# Patient Record
Sex: FEMALE | Race: WHITE | NOT HISPANIC OR LATINO | Employment: UNEMPLOYED | URBAN - NONMETROPOLITAN AREA
[De-identification: names, ages, dates, MRNs, and addresses within clinical notes are randomized per-mention and may not be internally consistent; named-entity substitution may affect disease eponyms.]

---
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
## 2020-01-08 ENCOUNTER — OFFICE VISIT (OUTPATIENT)
Dept: OBSTETRICS AND GYNECOLOGY | Facility: CLINIC | Age: 20
End: 2020-01-08

## 2020-01-08 VITALS
BODY MASS INDEX: 26.81 KG/M2 | HEIGHT: 59 IN | DIASTOLIC BLOOD PRESSURE: 68 MMHG | WEIGHT: 133 LBS | SYSTOLIC BLOOD PRESSURE: 102 MMHG

## 2020-01-08 DIAGNOSIS — N94.12 DEEP DYSPAREUNIA: Primary | ICD-10-CM

## 2020-01-08 LAB
CANDIDA ALBICANS: NEGATIVE
GARDNERELLA VAGINALIS: NEGATIVE
T VAGINALIS DNA VAG QL PROBE+SIG AMP: NEGATIVE

## 2020-01-08 PROCEDURE — 87480 CANDIDA DNA DIR PROBE: CPT | Performed by: NURSE PRACTITIONER

## 2020-01-08 PROCEDURE — 87660 TRICHOMONAS VAGIN DIR PROBE: CPT | Performed by: NURSE PRACTITIONER

## 2020-01-08 PROCEDURE — 99203 OFFICE O/P NEW LOW 30 MIN: CPT | Performed by: NURSE PRACTITIONER

## 2020-01-08 PROCEDURE — 87510 GARDNER VAG DNA DIR PROBE: CPT | Performed by: NURSE PRACTITIONER

## 2020-01-08 PROCEDURE — 87661 TRICHOMONAS VAGINALIS AMPLIF: CPT | Performed by: NURSE PRACTITIONER

## 2020-01-08 PROCEDURE — 87591 N.GONORRHOEAE DNA AMP PROB: CPT | Performed by: NURSE PRACTITIONER

## 2020-01-08 PROCEDURE — 87491 CHLMYD TRACH DNA AMP PROBE: CPT | Performed by: NURSE PRACTITIONER

## 2020-01-08 NOTE — PROGRESS NOTES
"Subjective   Marcie Roberts is a 19 y.o. here for painful sex    LMP: Amenorrhea with Depo-Provera  BC: Nexplanon      Pt is referred by her PCP for painful sex. She is accompanied by her spouse today. Pt reports painful sex for the last 2-3 months and has never had this problem before. She states, \"I feel like I'm getting stabbed\". Because of this pain, she and her  had sex only 1-2 times this month and as a result, she has low sexual desire. She reports the pain is deep and \"inside\"; there is no pain to the vulva or opening of the vagina. The pain only occurs with sex, does not persist after sex or occur any other time. She denies any postcoital bleeding, vaginal odor, discharge, or irritation. Denies hx of STIs.    Dyspareunia   This is a new problem. The current episode started more than 1 month ago. The problem occurs intermittently. The problem has been unchanged. Pertinent negatives include no abdominal pain, chills, fatigue, fever, nausea, rash, sore throat or weakness. Nothing aggravates the symptoms. She has tried nothing for the symptoms.       The following portions of the patient's history were reviewed and updated as appropriate: allergies, current medications, past family history, past medical history, past social history, past surgical history and problem list.    Review of Systems   Constitutional: Negative for chills, fatigue, fever, unexpected weight gain and unexpected weight loss.   HENT: Negative for sneezing and sore throat.    Respiratory: Negative for shortness of breath.    Gastrointestinal: Negative for abdominal pain, constipation, diarrhea and nausea.   Genitourinary: Positive for amenorrhea and dyspareunia. Negative for breast discharge, breast lump, breast pain, difficulty urinating, dysuria, frequency, menstrual problem, pelvic pain, pelvic pressure, urinary incontinence, vaginal bleeding, vaginal discharge and vaginal pain.        No periods with Nexplanon.     Skin: " Negative for rash.   Neurological: Negative for weakness and headache.   Psychiatric/Behavioral: Negative for sleep disturbance, depressed mood and stress.       Objective   Physical Exam   Constitutional: She is oriented to person, place, and time. She appears well-developed and well-nourished.   HENT:   Head: Normocephalic.   Neck: Normal range of motion.   Pulmonary/Chest: Effort normal.   Abdominal: Soft.   Genitourinary: Vagina normal. Rectal exam shows no external hemorrhoid. There is no rash, tenderness, lesion or injury on the right labia. There is tenderness on the left labia. There is no rash, lesion or injury on the left labia. Uterus is not deviated, not enlarged, not fixed and not tender. Cervix exhibits no motion tenderness, no discharge and no friability. Right adnexum displays no mass, no tenderness and no fullness. Left adnexum displays no mass, no tenderness and no fullness. No erythema, tenderness or bleeding in the vagina. No foreign body in the vagina. No signs of injury around the vagina. No vaginal discharge found.   Genitourinary Comments: GC and Vag panel swab.    Musculoskeletal: Normal range of motion.   Neurological: She is alert and oriented to person, place, and time.   Skin: Skin is warm and dry.   Psychiatric: Her behavior is normal.   Flat affect.    Nursing note and vitals reviewed.        Assessment/Plan   Diagnoses and all orders for this visit:    Deep dyspareunia  -     Gardnerella vaginalis, Trichomonas vaginalis, Candida albicans, DNA - Swab, Vagina  -     Chlamydia trachomatis, Neisseria gonorrhoeae, Trichomonas vaginalis, PCR - Swab, Vagina  -     US Non-ob Transvaginal; Future      Discussed various causes of painful sex such as infection, endometriosis; however sometimes it is difficult to determine cause. Will rule out STIs, pt declined STI via bloodwork. Will scheduled a TVUS; call pt with result.

## 2020-01-09 LAB
C TRACH RRNA CVX QL NAA+PROBE: NEGATIVE
N GONORRHOEA RRNA SPEC QL NAA+PROBE: NEGATIVE
TRICHOMONAS VAGINALIS PCR: NEGATIVE

## 2020-01-13 ENCOUNTER — TELEPHONE (OUTPATIENT)
Dept: OBSTETRICS AND GYNECOLOGY | Facility: CLINIC | Age: 20
End: 2020-01-13

## 2020-01-15 ENCOUNTER — TELEPHONE (OUTPATIENT)
Dept: OBSTETRICS AND GYNECOLOGY | Facility: CLINIC | Age: 20
End: 2020-01-15

## 2020-01-15 NOTE — TELEPHONE ENCOUNTER
Attempted to call patient back message states the number you are calling has been changed, disconnected, or no longer in service.

## 2020-01-17 ENCOUNTER — TELEPHONE (OUTPATIENT)
Dept: OBSTETRICS AND GYNECOLOGY | Facility: CLINIC | Age: 20
End: 2020-01-17

## 2020-01-17 RX ORDER — BUPROPION HYDROCHLORIDE 150 MG/1
150 TABLET ORAL EVERY MORNING
Qty: 30 TABLET | Refills: 2 | Status: SHIPPED | OUTPATIENT
Start: 2020-01-17 | End: 2021-01-16

## 2020-01-17 NOTE — TELEPHONE ENCOUNTER
Discussed TVUS results with patient. Pt reports that she wants treatment for decreased sex drive. Will try Wellbutrin 150 mg daily. Discuss RBA; gave 2 months supply. Call for refills.

## 2021-03-01 ENCOUNTER — INITIAL PRENATAL (OUTPATIENT)
Dept: OBSTETRICS AND GYNECOLOGY | Facility: CLINIC | Age: 21
End: 2021-03-01

## 2021-03-01 ENCOUNTER — LAB (OUTPATIENT)
Dept: LAB | Facility: HOSPITAL | Age: 21
End: 2021-03-01

## 2021-03-01 VITALS — BODY MASS INDEX: 27.27 KG/M2 | SYSTOLIC BLOOD PRESSURE: 118 MMHG | WEIGHT: 135 LBS | DIASTOLIC BLOOD PRESSURE: 74 MMHG

## 2021-03-01 DIAGNOSIS — O99.331 MATERNAL TOBACCO USE IN FIRST TRIMESTER: ICD-10-CM

## 2021-03-01 DIAGNOSIS — O21.9 NAUSEA AND VOMITING DURING PREGNANCY PRIOR TO 22 WEEKS GESTATION: ICD-10-CM

## 2021-03-01 DIAGNOSIS — Z36.87 ENCOUNTER FOR ANTENATAL SCREENING FOR UNCERTAIN DATES: ICD-10-CM

## 2021-03-01 DIAGNOSIS — A04.8 H. PYLORI INFECTION: ICD-10-CM

## 2021-03-01 DIAGNOSIS — Z34.81 MULTIGRAVIDA IN FIRST TRIMESTER: Primary | ICD-10-CM

## 2021-03-01 DIAGNOSIS — Z34.80 SUPERVISION OF OTHER NORMAL PREGNANCY: Primary | ICD-10-CM

## 2021-03-01 LAB
ABO GROUP BLD: NORMAL
AMPHET+METHAMPHET UR QL: NEGATIVE
AMPHETAMINES UR QL: NEGATIVE
BARBITURATES UR QL SCN: NEGATIVE
BASOPHILS # BLD AUTO: 0.03 10*3/MM3 (ref 0–0.2)
BASOPHILS NFR BLD AUTO: 0.3 % (ref 0–1.5)
BENZODIAZ UR QL SCN: NEGATIVE
BILIRUB UR QL STRIP: NEGATIVE
BLD GP AB SCN SERPL QL: NEGATIVE
BUPRENORPHINE SERPL-MCNC: NEGATIVE NG/ML
CANNABINOIDS SERPL QL: NEGATIVE
CLARITY UR: CLEAR
COCAINE UR QL: NEGATIVE
COLOR UR: YELLOW
DEPRECATED RDW RBC AUTO: 38.7 FL (ref 37–54)
EOSINOPHIL # BLD AUTO: 0.06 10*3/MM3 (ref 0–0.4)
EOSINOPHIL NFR BLD AUTO: 0.7 % (ref 0.3–6.2)
ERYTHROCYTE [DISTWIDTH] IN BLOOD BY AUTOMATED COUNT: 12 % (ref 12.3–15.4)
GLUCOSE UR STRIP-MCNC: NEGATIVE MG/DL
HBV SURFACE AG SERPL QL IA: NORMAL
HCT VFR BLD AUTO: 39.7 % (ref 34–46.6)
HCV AB SER DONR QL: NORMAL
HGB BLD-MCNC: 13.9 G/DL (ref 12–15.9)
HGB UR QL STRIP.AUTO: NEGATIVE
HIV1+2 AB SER QL: NORMAL
IMM GRANULOCYTES # BLD AUTO: 0.02 10*3/MM3 (ref 0–0.05)
IMM GRANULOCYTES NFR BLD AUTO: 0.2 % (ref 0–0.5)
KETONES UR QL STRIP: NEGATIVE
LEUKOCYTE ESTERASE UR QL STRIP.AUTO: NEGATIVE
LYMPHOCYTES # BLD AUTO: 1.73 10*3/MM3 (ref 0.7–3.1)
LYMPHOCYTES NFR BLD AUTO: 19.7 % (ref 19.6–45.3)
Lab: NORMAL
MCH RBC QN AUTO: 31.4 PG (ref 26.6–33)
MCHC RBC AUTO-ENTMCNC: 35 G/DL (ref 31.5–35.7)
MCV RBC AUTO: 89.8 FL (ref 79–97)
METHADONE UR QL SCN: NEGATIVE
MONOCYTES # BLD AUTO: 0.39 10*3/MM3 (ref 0.1–0.9)
MONOCYTES NFR BLD AUTO: 4.4 % (ref 5–12)
NEUTROPHILS NFR BLD AUTO: 6.54 10*3/MM3 (ref 1.7–7)
NEUTROPHILS NFR BLD AUTO: 74.7 % (ref 42.7–76)
NITRITE UR QL STRIP: NEGATIVE
NRBC BLD AUTO-RTO: 0 /100 WBC (ref 0–0.2)
OPIATES UR QL: NEGATIVE
OXYCODONE UR QL SCN: NEGATIVE
PCP UR QL SCN: NEGATIVE
PH UR STRIP.AUTO: 6.5 [PH] (ref 5–8)
PLATELET # BLD AUTO: 362 10*3/MM3 (ref 140–450)
PMV BLD AUTO: 9.7 FL (ref 6–12)
PROPOXYPH UR QL: NEGATIVE
PROT UR QL STRIP: NEGATIVE
RBC # BLD AUTO: 4.42 10*6/MM3 (ref 3.77–5.28)
RH BLD: NEGATIVE
RPR SER QL: NORMAL
RUBV IGG SERPL IA-ACNC: NORMAL
SP GR UR STRIP: 1.01 (ref 1–1.03)
TRICYCLICS UR QL SCN: NEGATIVE
UROBILINOGEN UR QL STRIP: NORMAL
WBC # BLD AUTO: 8.77 10*3/MM3 (ref 3.4–10.8)

## 2021-03-01 PROCEDURE — 81003 URINALYSIS AUTO W/O SCOPE: CPT | Performed by: NURSE PRACTITIONER

## 2021-03-01 PROCEDURE — 80306 DRUG TEST PRSMV INSTRMNT: CPT | Performed by: NURSE PRACTITIONER

## 2021-03-01 PROCEDURE — 36415 COLL VENOUS BLD VENIPUNCTURE: CPT

## 2021-03-01 PROCEDURE — 87491 CHLMYD TRACH DNA AMP PROBE: CPT | Performed by: NURSE PRACTITIONER

## 2021-03-01 PROCEDURE — 99214 OFFICE O/P EST MOD 30 MIN: CPT | Performed by: NURSE PRACTITIONER

## 2021-03-01 PROCEDURE — 80081 OBSTETRIC PANEL INC HIV TSTG: CPT | Performed by: NURSE PRACTITIONER

## 2021-03-01 PROCEDURE — 87086 URINE CULTURE/COLONY COUNT: CPT | Performed by: NURSE PRACTITIONER

## 2021-03-01 PROCEDURE — 87661 TRICHOMONAS VAGINALIS AMPLIF: CPT | Performed by: NURSE PRACTITIONER

## 2021-03-01 PROCEDURE — 87591 N.GONORRHOEAE DNA AMP PROB: CPT | Performed by: NURSE PRACTITIONER

## 2021-03-01 PROCEDURE — 86803 HEPATITIS C AB TEST: CPT | Performed by: NURSE PRACTITIONER

## 2021-03-01 RX ORDER — MULTIVITAMIN WITH IRON
100 TABLET ORAL
Qty: 30 TABLET | Refills: 2 | Status: SHIPPED | OUTPATIENT
Start: 2021-03-01 | End: 2021-10-26

## 2021-03-01 RX ORDER — PRENATAL VIT/IRON FUM/FOLIC AC 27MG-0.8MG
TABLET ORAL DAILY
COMMUNITY
End: 2021-12-07

## 2021-03-01 NOTE — PROGRESS NOTES
[facility]  Obstetrics Visit    CHIEF COMPLAINT:  New prenatal visit    HISTORY OF PRESENT ILLNESS:  Marcie Roberts is a 20 y.o. y/o  at Unknown by LMP (Patient's last menstrual period was 2020 (exact date).  This was a planned pregnancy and the patient is supported by her significant other.  Reports nausea with vomiting, she is interested in medication options.  Reports breast tenderness.  She denies any vaginal bleeding.  She has started taking a prenatal vitamin.    EPDS: 2    REVIEW OF SYSTEMS  Review of Systems   Constitutional: Negative for activity change, appetite change, chills, diaphoresis, fatigue, fever, unexpected weight gain and unexpected weight loss.   Respiratory: Negative for apnea, chest tightness and shortness of breath.    Cardiovascular: Negative for chest pain and palpitations.   Gastrointestinal: Negative for abdominal distention, abdominal pain, constipation and diarrhea.   Genitourinary: Negative for amenorrhea, breast discharge, breast lump, breast pain, decreased libido, decreased urine volume, difficulty urinating, dyspareunia, dysuria, flank pain, frequency, genital sores, hematuria, pelvic pain, pelvic pressure, urgency, urinary incontinence, vaginal bleeding, vaginal discharge and vaginal pain.   Musculoskeletal: Negative for myalgias.   Skin: Negative for color change, dry skin and skin lesions.   Neurological: Negative for light-headedness and headache.   Psychiatric/Behavioral: Negative for agitation, dysphoric mood, sleep disturbance, suicidal ideas, depressed mood and stress. The patient is not nervous/anxious.        PRENATAL RISK FACTORS  3/21 Problems (from 21 to present)     Problem Noted Resolved    Multigravida in first trimester 3/1/2021 by Billie Mane, ZO No    Nausea and vomiting during pregnancy prior to 22 weeks gestation 3/1/2021 by Billie Mane, ZO No    Maternal tobacco use in first  trimester 3/1/2021 by Billie Mane APRN No    H. pylori infection 3/1/2021 by Billie Mane APRN No          DATING CRITERIA:  LMP (2021) -- VJ 10/07/2021      OBSTETRIC HISTORY:  OB History    Para Term  AB Living   2 1 1     1   SAB TAB Ectopic Molar Multiple Live Births             1      # Outcome Date GA Lbr Zenon/2nd Weight Sex Delivery Anes PTL Lv   2 Current            1 Term 04/20/15 40w6d  2892 g (6 lb 6 oz) F Vag-Spont EPI  BRI     GYN HISTORY:  Denies h/o sexually transmitted infections/pelvic inflammatory disease  Denies h/o abnormal pap smears  Last pap smear:   Last Completed Pap Smear     Patient has no health maintenance due at this time        Denies h/o gynecologic surgeries, including biopsies of the cervix    PAST MEDICAL HISTORY:  Past Medical History:   Diagnosis Date   • ADHD    • Depot contraception    • Encounter for  visit    • Encounter for surveillance of injectable contraceptive    • H. pylori infection    • Hypertension    • Lower abdominal pain      PAST SURGICAL HISTORY:  Past Surgical History:   Procedure Laterality Date   • ADENOIDECTOMY     • APPENDECTOMY     • CHOLECYSTECTOMY     • EAR TUBES     • ENDOSCOPY  2017    Focal active chronic gastritis, Few Helicobacter organisms Dr. Moses   • INJECTION OF MEDICATION  2016    depo provera   • LAPAROSCOPIC CHOLECYSTECTOMY     • TONSILLECTOMY AND ADENOIDECTOMY     • WISDOM TOOTH EXTRACTION       FAMILY HISTORY:  Family History   Problem Relation Age of Onset   • ADD / ADHD Other    • Bipolar disorder Other    • Breast cancer Other    • Cancer Other    • Diabetes Other    • Heart disease Other    • Stroke Other    • Stomach cancer Other    • Other Other         respiratory disorder   • No Known Problems Daughter    • No Known Problems Sister    • No Known Problems Brother    • No Known Problems Half-Brother    • No Known Problems Half-Brother    • No Known Problems  Sister    • No Known Problems Half-Sister    • No Known Problems Half-Sister    • Colon polyps Neg Hx    • Colon cancer Neg Hx      SOCIAL HISTORY:  Social History     Socioeconomic History   • Marital status: Single     Spouse name: Not on file   • Number of children: Not on file   • Years of education: Not on file   • Highest education level: Not on file   Tobacco Use   • Smoking status: Current Every Day Smoker     Packs/day: 0.00     Types: Cigarettes   • Smokeless tobacco: Never Used   • Tobacco comment: 2 cigarettes per day- quitting     Substance and Sexual Activity   • Alcohol use: Not Currently   • Drug use: Never   • Sexual activity: Yes     Partners: Male     Comment: last pap smear 8/2020 at [facility]. patient signed release      GENETIC SCREENING:  Age >36 yo as of VJ: no  Thalassemia: no  NTD: no  CHD: no  Down Syndrome/MR/Fragile X/Autism: no  Ashkenazi Muslim with Andreas-Sachs, [place], familial dysautonomia: no  Sickle cell disease or trait: no  Hemophilia: no  Muscular dystrophy: no  Cystic fibrosis: no  Greg's chorea: no  Birth defects: no  Genetic/chromosomal disorders: no    INFECTION HISTORY:  TB exposure: no  HSV: no  Illness since LMP: no  Prior GBS infected child: no  STIs: no    ALLERGIES:  No Known Allergies    MEDICATIONS:  Prior to Admission medications    Medication Sig Start Date End Date Taking? Authorizing Provider   Oxymetazoline HCl (MUCINEX NASAL SPRAY MOISTURE NA) into the nostril(s) as directed by provider.   Yes ProviderWale MD   Prenatal Vit-Fe Fumarate-FA (prenatal vitamin 27-0.8) 27-0.8 MG tablet tablet Take  by mouth Daily.   Yes ProviderWale MD   doxylamine (UNISOM) 25 MG tablet Take 1 tablet by mouth every night at bedtime. 3/1/21   Billie Mane APRN   vitamin B-6 (PYRIDOXINE) 100 MG tablet Take 1 tablet by mouth every night at bedtime. 3/1/21   Billie Mane APRN       PHYSICAL EXAM:   LMP 12/31/2020  (Exact Date)   General: Alert, healthy, no distress, well nourished and well developed.  Neurologic: Alert, oriented to person, place, and time.  Gait normal.  Cranial nerves II-XII grossly intact.  HEENT: Normocephalic, atraumatic.  Extraocular muscles intact, pupils equal and reactive x2.    Teeth: Normal hygiene.  Neck: Supple, no adenopathy, thyroid normal size, non-tender, without nodularity, trachea midline.  Breasts: No masses, skin dimpling, skin retraction, nipple discharge, or asymmetry bilaterally.  Lungs: Normal respiratory effort.  Clear to auscultation bilaterally.  No wheezes, rhonci, or rales.  Heart: Regular rate and rhythm.  No murmer, rub or gallop.  Abdomen: Soft, non-tender, non-distended,no masses, no hepatosplenomegaly, no hernia.  Skin: No rash, no lesions.  Extremities: No cyanosis, clubbing or edema.    Bedside ultrasound performed by myself which shows the findings below: +HHUS      IMPRESSION:  Marcie Roberts is a 20 y.o.  at Unknown for a new prenatal visit.    PLAN:  1.  NOB  - Options counseling performed and patient desires continuation of pregnancy to term   - Prenatal labs ordered  - Genetic testing, including cystic fibrosis, was discussed and patient declines  - Continue prenatal vitamins  - Weight gain counseling performed.   - Pregravid BMI 25-29.9: Recommend 15-25 lb  - Return to clinic in 4 weeks for return prenatal visit, dating scan next week  - Reviewed COVID-19 visitation policy  - Reviewed COVID-19 precautions     Diagnosis Plan   1. Multigravida in first trimester     2. Nausea and vomiting during pregnancy prior to 22 weeks gestation  vitamin B-6 (PYRIDOXINE) 100 MG tablet    doxylamine (UNISOM) 25 MG tablet   3. Maternal tobacco use in first trimester  Down to 2 cigarettes per day, encouraged complete smoking cessation   4. H. pylori infection     5. Encounter for  screening for uncertain dates  US Ob Transvaginal     Billie Mane  APRN  3/1/2021  13:14 CST

## 2021-03-01 NOTE — PROGRESS NOTES
"I spent approximately 45 minutes with the patient acquiring the health and history intake and discussing topics related to healthy lifestyle. She is accompanied by her boyfriend. She brings proof of pregnancy from [facility].  Her LMP is 12/31/20. This is her 2nd  pregnancy. She had a vaginal delivery here with her daughter. She had no complications. Mack Caro CNM delivered. The FOB's uncle has asperger's syndrome. There is not other significant family history.  A newob bag is given. The 1st trimester teaching was done with the patient. We discussed a healthy diet and exercise and what is recommended. I also discussed Listeriosis and Toxoplasmosis and what fish to avoid due to high mercury levels. I informed patient not to be in hot tubs, saunas, or tanning beds. We discussed that spotting may occur after intercourse which is common, but if heavy bleeding like a period occurs to [facility] hospital if clinic is closed.  I encouraged her to make an appointment with the dentist if she has not had a dental exam and cleaning in the last 6 months.  I instructed the patient that alcohol, illicit drug use, and tobacco smoking are not recommended in pregnancy. She is quitting smoking cigarettes. She is down to 2 cigarettes daily. I encouraged her not to be around secondhand smoke either.  She plans to breastfeed. She did not breastfeed her daughter, but she plans to breastfeed this time.  I gave her pamphlet on breastfeeding classes and the breastfeeding mothers support group. These services are provided by Trena Naqvi, Lactation Consultant. She filled out the health department referral form and depression screening questionnaire. She scored \"2\" on her depression screening form. She says she has ADHD, but she is not on medicine.  I encouraged the patient to get the TDAP vaccine in the 3rd trimester.  I discussed with the patient that a pediatrician needs to be chosen prior to delivery for " the infant to have an appointment scheduled before leaving the hospital. She plans to continue with Dr. Escobar who her daughter sees.  Her last pap smear was done at [facility] in August 2020. She signed a release for us to get her results.  I discussed lab tests will be done today. All questions were answered at this time. Her appointment is with Billie PATHAK today.

## 2021-03-02 LAB
BACTERIA SPEC AEROBE CULT: NORMAL
C TRACH RRNA CVX QL NAA+PROBE: NEGATIVE
N GONORRHOEA RRNA SPEC QL NAA+PROBE: NEGATIVE
TRICHOMONAS VAGINALIS PCR: NEGATIVE

## 2021-03-29 ENCOUNTER — ROUTINE PRENATAL (OUTPATIENT)
Dept: OBSTETRICS AND GYNECOLOGY | Facility: CLINIC | Age: 21
End: 2021-03-29

## 2021-03-29 VITALS — DIASTOLIC BLOOD PRESSURE: 60 MMHG | BODY MASS INDEX: 26.86 KG/M2 | SYSTOLIC BLOOD PRESSURE: 102 MMHG | WEIGHT: 133 LBS

## 2021-03-29 DIAGNOSIS — Z3A.12 12 WEEKS GESTATION OF PREGNANCY: Primary | ICD-10-CM

## 2021-03-29 DIAGNOSIS — A04.8 H. PYLORI INFECTION: ICD-10-CM

## 2021-03-29 DIAGNOSIS — Z34.81 MULTIGRAVIDA IN FIRST TRIMESTER: ICD-10-CM

## 2021-03-29 PROBLEM — O99.331 MATERNAL TOBACCO USE IN FIRST TRIMESTER: Status: RESOLVED | Noted: 2021-03-01 | Resolved: 2021-03-29

## 2021-03-29 PROCEDURE — 99213 OFFICE O/P EST LOW 20 MIN: CPT | Performed by: NURSE PRACTITIONER

## 2021-03-29 NOTE — PROGRESS NOTES
CC: Prenatal visit    Marcie Roberts is a 20 y.o.  at 12w4d.  Doing well.  No complaints.  Denies contractions, LOF, or VB.      /60   Wt 60.3 kg (133 lb)   LMP 2020 (Exact Date)   BMI 26.86 kg/m²             3/21 Problems (from 21 to present)     Problem Noted Resolved    Multigravida in first trimester 3/1/2021 by Billie Mane APRN No    Nausea and vomiting during pregnancy prior to 22 weeks gestation 3/1/2021 by Billie Mane APRN No    H. pylori infection 3/1/2021 by Billie Mane APRN No    Maternal tobacco use in first trimester 3/1/2021 by Billie Mane APRN 3/29/2021 by Billie Mane APRN          A/P: Marcie Roberts is a 20 y.o.  at 12w4d.   Reviewed NOB labs, rubella equivocal- can repeat at 3T if pt would like   - RTC in 4 weeks for LEONA appt      Diagnosis Plan   1. 12 weeks gestation of pregnancy     2. Multigravida in first trimester     3. H. pylori infection         ZO Rodríguez  3/29/2021  13:32 CDT

## 2021-04-26 ENCOUNTER — ROUTINE PRENATAL (OUTPATIENT)
Dept: OBSTETRICS AND GYNECOLOGY | Facility: CLINIC | Age: 21
End: 2021-04-26

## 2021-04-26 VITALS — DIASTOLIC BLOOD PRESSURE: 58 MMHG | WEIGHT: 138 LBS | BODY MASS INDEX: 27.87 KG/M2 | SYSTOLIC BLOOD PRESSURE: 102 MMHG

## 2021-04-26 DIAGNOSIS — Z36.89 ENCOUNTER FOR FETAL ANATOMIC SURVEY: ICD-10-CM

## 2021-04-26 DIAGNOSIS — Z34.82 MULTIGRAVIDA IN SECOND TRIMESTER: ICD-10-CM

## 2021-04-26 DIAGNOSIS — A04.8 H. PYLORI INFECTION: ICD-10-CM

## 2021-04-26 DIAGNOSIS — Z3A.16 16 WEEKS GESTATION OF PREGNANCY: Primary | ICD-10-CM

## 2021-04-26 PROBLEM — O21.9 NAUSEA AND VOMITING DURING PREGNANCY PRIOR TO 22 WEEKS GESTATION: Status: RESOLVED | Noted: 2021-03-01 | Resolved: 2021-04-26

## 2021-04-26 PROCEDURE — 99213 OFFICE O/P EST LOW 20 MIN: CPT | Performed by: NURSE PRACTITIONER

## 2021-04-26 NOTE — PROGRESS NOTES
CC: Prenatal visit    Marcie Robetrs is a 20 y.o.  at 16w4d.  Doing well.  No complaints.  Denies contractions, LOF, or VB.  Reports +FM.    /58   Wt 62.6 kg (138 lb)   LMP 2020 (Exact Date)   BMI 27.87 kg/m²             3/21 Problems (from 21 to present)     Problem Noted Resolved    Multigravida in second trimester 3/1/2021 by Billie Mane APRN No    H. pylori infection 3/1/2021 by Billie Mane APRN No    Nausea and vomiting during pregnancy prior to 22 weeks gestation 3/1/2021 by Billie Mane APRN 2021 by Billie Mane APRN    Maternal tobacco use in first trimester 3/1/2021 by Billie Mane APRN 3/29/2021 by Billie Mane APRN          A/P: Marcie Roberts is a 20 y.o.  at 16w4d.  - RTC in 4 weeks for LEONA appt and fetal anatomy scan      Diagnosis Plan   1. 16 weeks gestation of pregnancy     2. Multigravida in second trimester     3. H. pylori infection     4. Encounter for fetal anatomic survey  MFM OB  ZO Curtis  2021  11:04 CDT

## 2021-04-28 ENCOUNTER — TELEPHONE (OUTPATIENT)
Dept: OBSTETRICS AND GYNECOLOGY | Facility: CLINIC | Age: 21
End: 2021-04-28

## 2021-04-28 DIAGNOSIS — O26.892 HEADACHE IN PREGNANCY, ANTEPARTUM, SECOND TRIMESTER: Primary | ICD-10-CM

## 2021-04-28 DIAGNOSIS — R51.9 HEADACHE IN PREGNANCY, ANTEPARTUM, SECOND TRIMESTER: Primary | ICD-10-CM

## 2021-04-28 NOTE — TELEPHONE ENCOUNTER
Pt called with complaints of intermittent headaches for the past week.  She has no other related symptoms.  Pt has tried increasing water intake and tylenol with little relief.  Continue staying hydrated.  Can try magnesium supplement.  If sinus pressure related may take sudafed as needed sparingly.

## 2021-05-24 ENCOUNTER — ROUTINE PRENATAL (OUTPATIENT)
Dept: OBSTETRICS AND GYNECOLOGY | Facility: CLINIC | Age: 21
End: 2021-05-24

## 2021-05-24 VITALS — WEIGHT: 137 LBS | BODY MASS INDEX: 27.67 KG/M2 | DIASTOLIC BLOOD PRESSURE: 60 MMHG | SYSTOLIC BLOOD PRESSURE: 102 MMHG

## 2021-05-24 DIAGNOSIS — Z3A.20 20 WEEKS GESTATION OF PREGNANCY: ICD-10-CM

## 2021-05-24 DIAGNOSIS — Z34.82 ENCOUNTER FOR SUPERVISION OF OTHER NORMAL PREGNANCY IN SECOND TRIMESTER: Primary | ICD-10-CM

## 2021-05-24 DIAGNOSIS — A04.8 H. PYLORI INFECTION: ICD-10-CM

## 2021-05-24 PROBLEM — Z34.90 SUPERVISION OF NORMAL PREGNANCY: Status: ACTIVE | Noted: 2021-03-01

## 2021-05-24 PROCEDURE — 99213 OFFICE O/P EST LOW 20 MIN: CPT | Performed by: OBSTETRICS & GYNECOLOGY

## 2021-05-24 NOTE — PROGRESS NOTES
CC: Prenatal visit    Marcie Roberts is a 21 y.o.  at 20w4d.  Doing well.  Denies contractions, LOF, or VB.  Having some muscle leg cramps bilaterally.    /60   Wt 62.1 kg (137 lb)   LMP 2020 (Exact Date)   BMI 27.67 kg/m²      Fetal Heart Rate: 153     Prelim US- EFW 391g w/ AC 67%ile, MVP 5.2 cm, CL 4.25 cm, cephalic, placenta anterior, anatomy WNL, gender suprise    3/21 Problems (from 21 to present)     Problem Noted Resolved    Supervision of normal pregnancy 3/1/2021 by Billie Mane APRN No    H. pylori infection 3/1/2021 by Billie Mane APRN No    Overview Signed 2021 11:27 AM by Mae Hernandez MD     Carafate, Nexium         Nausea and vomiting during pregnancy prior to 22 weeks gestation 3/1/2021 by Billie Mane APRN 2021 by Billie Mane APRN    Maternal tobacco use in first trimester 3/1/2021 by Billie Mane APRN 3/29/2021 by Billie Mane APRN        A/P: Marcie Roberts is a 21 y.o.  at 20w4d.  - RTC in 4 weeks  - Discussed no add'l US indicated  - Encouraged adequate PO hydration, Benadryl PRN  - Reviewed COVID-19 visitation policy  - Reviewed COVID-19 precautions     Diagnosis Plan   1. Encounter for supervision of other normal pregnancy in second trimester     2. H. pylori infection     3. 20 weeks gestation of pregnancy       Mae Hernandez MD  2021  11:30 CDT

## 2021-06-28 ENCOUNTER — ROUTINE PRENATAL (OUTPATIENT)
Dept: OBSTETRICS AND GYNECOLOGY | Facility: CLINIC | Age: 21
End: 2021-06-28

## 2021-06-28 VITALS — WEIGHT: 148.4 LBS | DIASTOLIC BLOOD PRESSURE: 64 MMHG | BODY MASS INDEX: 29.97 KG/M2 | SYSTOLIC BLOOD PRESSURE: 108 MMHG

## 2021-06-28 DIAGNOSIS — Z34.82 ENCOUNTER FOR SUPERVISION OF OTHER NORMAL PREGNANCY IN SECOND TRIMESTER: Primary | ICD-10-CM

## 2021-06-28 DIAGNOSIS — Z3A.25 25 WEEKS GESTATION OF PREGNANCY: ICD-10-CM

## 2021-06-28 DIAGNOSIS — A04.8 H. PYLORI INFECTION: ICD-10-CM

## 2021-06-28 PROCEDURE — 99213 OFFICE O/P EST LOW 20 MIN: CPT | Performed by: OBSTETRICS & GYNECOLOGY

## 2021-06-29 NOTE — PROGRESS NOTES
CC: Prenatal visit    Marcie Roberts is a 21 y.o.  at 25w4d.  Doing well.  Denies contractions, LOF, or VB.  Reports good FM.    /64   Wt 67.3 kg (148 lb 6.4 oz)   LMP 2020 (Exact Date)   BMI 29.97 kg/m²   Fundal Height (cm): 26 cm  Fetal Heart Rate: 139    3/21 Problems (from 21 to present)     Problem Noted Resolved    Supervision of normal pregnancy 3/1/2021 by Billie Mane APRN No    H. pylori infection 3/1/2021 by Billie Mane APRN No    Overview Signed 2021 11:27 AM by Mae Hernandez MD     Carafate, Nexium         Nausea and vomiting during pregnancy prior to 22 weeks gestation 3/1/2021 by Billie Mane APRN 2021 by Billie Mane APRN    Maternal tobacco use in first trimester 3/1/2021 by Billie Mane, ZO 3/29/2021 by Billie Mane APRN        A/P: Marcie Roberts is a 21 y.o.  at 25w4d.  - RTC in 3 weeks w/ 3T labs, Tdap, breastpump script  - Discussed that no further US indicated as discussed at last visit  - Reviewed COVID-19 visitation policy  - Reviewed COVID-19 precautions     Diagnosis Plan   1. Encounter for supervision of other normal pregnancy in second trimester  CBC (No Diff)    Glucose, Post 50 Gm Glucola   2. H. pylori infection     3. 25 weeks gestation of pregnancy       Mae Hernandez MD  2021  20:25 CDT

## 2021-07-19 ENCOUNTER — LAB (OUTPATIENT)
Dept: LAB | Facility: HOSPITAL | Age: 21
End: 2021-07-19

## 2021-07-19 ENCOUNTER — ROUTINE PRENATAL (OUTPATIENT)
Dept: OBSTETRICS AND GYNECOLOGY | Facility: CLINIC | Age: 21
End: 2021-07-19

## 2021-07-19 VITALS — SYSTOLIC BLOOD PRESSURE: 106 MMHG | DIASTOLIC BLOOD PRESSURE: 64 MMHG | WEIGHT: 149.8 LBS | BODY MASS INDEX: 30.26 KG/M2

## 2021-07-19 DIAGNOSIS — Z34.83 ENCOUNTER FOR SUPERVISION OF OTHER NORMAL PREGNANCY IN THIRD TRIMESTER: Primary | ICD-10-CM

## 2021-07-19 DIAGNOSIS — Z34.82 ENCOUNTER FOR SUPERVISION OF OTHER NORMAL PREGNANCY IN SECOND TRIMESTER: ICD-10-CM

## 2021-07-19 DIAGNOSIS — A04.8 H. PYLORI INFECTION: ICD-10-CM

## 2021-07-19 DIAGNOSIS — Z23 NEED FOR DIPHTHERIA-TETANUS-PERTUSSIS (TDAP) VACCINE: ICD-10-CM

## 2021-07-19 DIAGNOSIS — O26.893 RH NEGATIVE STATUS DURING PREGNANCY IN THIRD TRIMESTER: ICD-10-CM

## 2021-07-19 DIAGNOSIS — Z3A.28 28 WEEKS GESTATION OF PREGNANCY: ICD-10-CM

## 2021-07-19 DIAGNOSIS — Z67.91 RH NEGATIVE STATUS DURING PREGNANCY IN THIRD TRIMESTER: ICD-10-CM

## 2021-07-19 LAB
DEPRECATED RDW RBC AUTO: 40.6 FL (ref 37–54)
ERYTHROCYTE [DISTWIDTH] IN BLOOD BY AUTOMATED COUNT: 12.2 % (ref 12.3–15.4)
GLUCOSE 1H P 100 G GLC PO SERPL-MCNC: 127 MG/DL (ref 65–139)
HCT VFR BLD AUTO: 35.4 % (ref 34–46.6)
HGB BLD-MCNC: 12.4 G/DL (ref 12–15.9)
MCH RBC QN AUTO: 32 PG (ref 26.6–33)
MCHC RBC AUTO-ENTMCNC: 35 G/DL (ref 31.5–35.7)
MCV RBC AUTO: 91.2 FL (ref 79–97)
PLATELET # BLD AUTO: 358 10*3/MM3 (ref 140–450)
PMV BLD AUTO: 10.1 FL (ref 6–12)
RBC # BLD AUTO: 3.88 10*6/MM3 (ref 3.77–5.28)
WBC # BLD AUTO: 8.09 10*3/MM3 (ref 3.4–10.8)

## 2021-07-19 PROCEDURE — 85027 COMPLETE CBC AUTOMATED: CPT

## 2021-07-19 PROCEDURE — 90715 TDAP VACCINE 7 YRS/> IM: CPT | Performed by: OBSTETRICS & GYNECOLOGY

## 2021-07-19 PROCEDURE — 99213 OFFICE O/P EST LOW 20 MIN: CPT | Performed by: OBSTETRICS & GYNECOLOGY

## 2021-07-19 PROCEDURE — 36415 COLL VENOUS BLD VENIPUNCTURE: CPT

## 2021-07-19 PROCEDURE — 90471 IMMUNIZATION ADMIN: CPT | Performed by: OBSTETRICS & GYNECOLOGY

## 2021-07-19 PROCEDURE — 96372 THER/PROPH/DIAG INJ SC/IM: CPT | Performed by: OBSTETRICS & GYNECOLOGY

## 2021-07-19 PROCEDURE — 82950 GLUCOSE TEST: CPT

## 2021-07-19 NOTE — PROGRESS NOTES
CC: Prenatal visit    Marcie Roberts is a 21 y.o.  at 28w4d.  Doing well.  Denies contractions, LOF, or VB.  Reports good FM.    /64   Wt 67.9 kg (149 lb 12.8 oz)   LMP 2020 (Exact Date)   BMI 30.26 kg/m²   Fundal Height (cm): 29 cm  Fetal Heart Rate: 149    3/21 Problems (from 21 to present)     Problem Noted Resolved    Supervision of normal pregnancy 3/1/2021 by Billie Mane APRN No    H. pylori infection 3/1/2021 by Billie Mane APRN No    Overview Signed 2021 11:27 AM by Mae Hernandez MD     Carafate, Nexium         Nausea and vomiting during pregnancy prior to 22 weeks gestation 3/1/2021 by Billie Mane APRN 2021 by Billie Mane APRN    Maternal tobacco use in first trimester 3/1/2021 by Billie Mane, ZO 3/29/2021 by Billie Mane APRN        A/P: Marcie Roberts is a 21 y.o.  at 28w4d.  - RTC in 2 weeks  - 3T labs today  - Tdap today  - RhoGAM today  - Breastpump script sent  - Reviewed COVID-19 visitation policy  - Reviewed COVID-19 precautions     Diagnosis Plan   1. Encounter for supervision of other normal pregnancy in third trimester     2. H. pylori infection     3. 28 weeks gestation of pregnancy       Mae Hernandez MD  2021  10:06 CDT

## 2021-07-20 ENCOUNTER — TELEPHONE (OUTPATIENT)
Dept: OBSTETRICS AND GYNECOLOGY | Facility: CLINIC | Age: 21
End: 2021-07-20

## 2021-07-20 NOTE — TELEPHONE ENCOUNTER
"Tried calling patient, unable to reach. Unable to leave message.    \"voicemail box is not set up\"  "

## 2021-07-20 NOTE — TELEPHONE ENCOUNTER
----- Message from Mae Hernandez MD sent at 7/20/2021  9:01 AM CDT -----  Please let her know that she is not anemic or have GDM.

## 2021-07-21 ENCOUNTER — TELEPHONE (OUTPATIENT)
Dept: OBSTETRICS AND GYNECOLOGY | Facility: CLINIC | Age: 21
End: 2021-07-21

## 2021-08-02 ENCOUNTER — ROUTINE PRENATAL (OUTPATIENT)
Dept: OBSTETRICS AND GYNECOLOGY | Facility: CLINIC | Age: 21
End: 2021-08-02

## 2021-08-02 VITALS — BODY MASS INDEX: 30.22 KG/M2 | SYSTOLIC BLOOD PRESSURE: 114 MMHG | WEIGHT: 149.6 LBS | DIASTOLIC BLOOD PRESSURE: 64 MMHG

## 2021-08-02 DIAGNOSIS — O09.93 SUPERVISION OF HIGH RISK PREGNANCY IN THIRD TRIMESTER: Primary | ICD-10-CM

## 2021-08-02 DIAGNOSIS — Z3A.30 30 WEEKS GESTATION OF PREGNANCY: ICD-10-CM

## 2021-08-02 DIAGNOSIS — A04.8 H. PYLORI INFECTION: ICD-10-CM

## 2021-08-02 DIAGNOSIS — O26.893 RH NEGATIVE STATUS DURING PREGNANCY IN THIRD TRIMESTER: ICD-10-CM

## 2021-08-02 DIAGNOSIS — Z67.91 RH NEGATIVE STATUS DURING PREGNANCY IN THIRD TRIMESTER: ICD-10-CM

## 2021-08-02 PROCEDURE — 99212 OFFICE O/P EST SF 10 MIN: CPT | Performed by: OBSTETRICS & GYNECOLOGY

## 2021-08-02 NOTE — PROGRESS NOTES
CC: Prenatal visit    Marcie Roberts is a 21 y.o.  at 30w4d.  Doing well.  Denies contractions, LOF, or VB.  Reports good FM.  Reports leg cramps bilaterally.  She has back pain and lower pelvic pain x2 weeks.    /64   Wt 67.9 kg (149 lb 9.6 oz)   LMP 2020 (Exact Date)   BMI 30.22 kg/m²   Fundal Height (cm): 31 cm  Fetal Heart Rate: 145    3/21 Problems (from 21 to present)     Problem Noted Resolved    Rh negative status during pregnancy in third trimester 2021 by Mae Hernandez MD No    Supervision of high risk pregnancy in third trimester 3/1/2021 by Billie Mane APRN No    H. pylori infection 3/1/2021 by Billie Mane APRN No    Overview Signed 2021 11:27 AM by Mae Hernandez MD     Carafate, Nexium         Nausea and vomiting during pregnancy prior to 22 weeks gestation 3/1/2021 by Billie Mane APRN 2021 by Billie Mane APRN    Maternal tobacco use in first trimester 3/1/2021 by Billie Mane, APRVARSHA 3/29/2021 by Billie Mane APRN        A/P: Marcie Roberts is a 21 y.o.  at 30w4d.  - RTC in 2 weeks  - Advised Tylenol, heating pad, support belt (not in stock unfortunately)  - Advised OTC magnesium, Benadryl for leg cramps  - Reviewed COVID-19 visitation policy  - Reviewed COVID-19 precautions     Diagnosis Plan   1. Supervision of high risk pregnancy in third trimester     2. Rh negative status during pregnancy in third trimester     3. H. pylori infection     4. 30 weeks gestation of pregnancy       Mae Hernandez MD  2021  11:20 CDT

## 2021-08-05 ENCOUNTER — TELEPHONE (OUTPATIENT)
Dept: OBSTETRICS AND GYNECOLOGY | Facility: CLINIC | Age: 21
End: 2021-08-05

## 2021-08-05 NOTE — TELEPHONE ENCOUNTER
PATIENT CALLED AND SAID THAT HER  WAS TESTED POSITIVE FOR COVID AND SHE WAS TOLD BY THE HEALTH DEPT TO CALL US TO SEE IF SHE NEEDS TO BE TESTED. ADVISED PT SHE CAN GET A TEST TO MAKE SURE SHE DOESN'T HAVE THE COVID. BUT WE GOT BY THE GUIDELINES OF HEALTH DEPT EVEN WITH OB PATIENTS.

## 2021-08-31 ENCOUNTER — ROUTINE PRENATAL (OUTPATIENT)
Dept: OBSTETRICS AND GYNECOLOGY | Facility: CLINIC | Age: 21
End: 2021-08-31

## 2021-08-31 VITALS — WEIGHT: 153.2 LBS | BODY MASS INDEX: 30.94 KG/M2 | SYSTOLIC BLOOD PRESSURE: 116 MMHG | DIASTOLIC BLOOD PRESSURE: 66 MMHG

## 2021-08-31 DIAGNOSIS — O09.93 SUPERVISION OF HIGH RISK PREGNANCY IN THIRD TRIMESTER: Primary | ICD-10-CM

## 2021-08-31 DIAGNOSIS — Z3A.34 34 WEEKS GESTATION OF PREGNANCY: ICD-10-CM

## 2021-08-31 DIAGNOSIS — A04.8 H. PYLORI INFECTION: ICD-10-CM

## 2021-08-31 DIAGNOSIS — O26.893 RH NEGATIVE STATUS DURING PREGNANCY IN THIRD TRIMESTER: ICD-10-CM

## 2021-08-31 DIAGNOSIS — Z67.91 RH NEGATIVE STATUS DURING PREGNANCY IN THIRD TRIMESTER: ICD-10-CM

## 2021-08-31 PROCEDURE — 99212 OFFICE O/P EST SF 10 MIN: CPT | Performed by: OBSTETRICS & GYNECOLOGY

## 2021-08-31 NOTE — PROGRESS NOTES
CC: Prenatal visit    Marcie Roberts is a 21 y.o.  at 34w5d.  Doing well.  Denies contractions, LOF, or VB.  Reports good FM.    /66   Wt 69.5 kg (153 lb 3.2 oz)   LMP 2020 (Exact Date)   BMI 30.94 kg/m²   Fundal Height (cm): 34 cm  Fetal Heart Rate: 134    3/21 Problems (from 21 to present)     Problem Noted Resolved    Rh negative status during pregnancy in third trimester 2021 by Mae Hernandez MD No    Overview Signed 2021 10:02 AM by Mae Hernandez MD     RhoGAM          Supervision of high risk pregnancy in third trimester 3/1/2021 by Billie Mane APRN No    H. pylori infection 3/1/2021 by Billie Mane APRN No    Overview Signed 2021 11:27 AM by Mae Hernandez MD     Carafate, Nexium         Nausea and vomiting during pregnancy prior to 22 weeks gestation 3/1/2021 by Billie Mane APRN 2021 by Billie Mane APRN    Maternal tobacco use in first trimester 3/1/2021 by Billie Mane, ZO 3/29/2021 by Billie Mane APRN        A/P: Marcie Roberts is a 21 y.o.  at 34w5d.  - RTC in 2 weeks w/ GBS  - Discussed no pelvic support belts in stock.  Recommend ordering online for her current weight.  - Reviewed COVID-19 visitation policy  - Reviewed COVID-19 precautions     Diagnosis Plan   1. Supervision of high risk pregnancy in third trimester     2. Rh negative status during pregnancy in third trimester     3. H. pylori infection     4. 34 weeks gestation of pregnancy       Mae Hernandez MD  2021  10:02 CDT

## 2021-09-14 ENCOUNTER — ROUTINE PRENATAL (OUTPATIENT)
Dept: OBSTETRICS AND GYNECOLOGY | Facility: CLINIC | Age: 21
End: 2021-09-14

## 2021-09-14 VITALS — WEIGHT: 151 LBS | DIASTOLIC BLOOD PRESSURE: 60 MMHG | BODY MASS INDEX: 30.5 KG/M2 | SYSTOLIC BLOOD PRESSURE: 110 MMHG

## 2021-09-14 DIAGNOSIS — Z3A.36 36 WEEKS GESTATION OF PREGNANCY: Primary | ICD-10-CM

## 2021-09-14 DIAGNOSIS — A04.8 H. PYLORI INFECTION: ICD-10-CM

## 2021-09-14 DIAGNOSIS — O09.93 SUPERVISION OF HIGH RISK PREGNANCY IN THIRD TRIMESTER: ICD-10-CM

## 2021-09-14 DIAGNOSIS — Z67.91 RH NEGATIVE STATUS DURING PREGNANCY IN THIRD TRIMESTER: ICD-10-CM

## 2021-09-14 DIAGNOSIS — O26.893 RH NEGATIVE STATUS DURING PREGNANCY IN THIRD TRIMESTER: ICD-10-CM

## 2021-09-14 PROCEDURE — 99212 OFFICE O/P EST SF 10 MIN: CPT | Performed by: NURSE PRACTITIONER

## 2021-09-14 PROCEDURE — 87653 STREP B DNA AMP PROBE: CPT | Performed by: NURSE PRACTITIONER

## 2021-09-14 RX ORDER — LORATADINE 10 MG/1
TABLET ORAL
COMMUNITY
Start: 2021-09-08 | End: 2022-09-12

## 2021-09-14 NOTE — PROGRESS NOTES
CC: Prenatal visit    Marcie Roberts is a 21 y.o.  at 36w5d.  Doing well.  No complaints.  Denies contractions, LOF, or VB.  Reports good FM.    /60   Wt 68.5 kg (151 lb)   LMP 2020 (Exact Date)   BMI 30.50 kg/m²   SVE: 1/30/-3  Fundal Height (cm): 36 cm  Fetal Heart Rate: 140    3/21 Problems (from 21 to present)     Problem Noted Resolved    Rh negative status during pregnancy in third trimester 2021 by Mae Hernandez MD No    Overview Signed 2021 10:02 AM by Mae Hernandez MD     RhoG          Supervision of high risk pregnancy in third trimester 3/1/2021 by Billie Mane APRN No    H. pylori infection 3/1/2021 by Billie Mane APRN No    Overview Signed 2021 11:27 AM by Mae Hernandez MD     Carafate, Nexium         Nausea and vomiting during pregnancy prior to 22 weeks gestation 3/1/2021 by Billie Mane APRN 2021 by Billie Mane APRN    Maternal tobacco use in first trimester 3/1/2021 by Billie Mane, ZO 3/29/2021 by Billie Mane APRN          A/P: Marcie Roberts is a 21 y.o.  at 36w5d.  Discussed spotting common after SVE  Educated on FKCs   GBS RV swab obtained today.  Labs up to date.   - RTC in 1 weeks     Diagnosis Plan   1. 36 weeks gestation of pregnancy  Group B Strep (Molecular) - Swab, Vaginal/Rectum   2. Supervision of high risk pregnancy in third trimester     3. Rh negative status during pregnancy in third trimester     4. H. pylori infection         ZO Rodríguez  2021  13:20 CDT

## 2021-09-15 LAB — GROUP B STREP, DNA: NEGATIVE

## 2021-09-21 ENCOUNTER — ROUTINE PRENATAL (OUTPATIENT)
Dept: OBSTETRICS AND GYNECOLOGY | Facility: CLINIC | Age: 21
End: 2021-09-21

## 2021-09-21 VITALS — BODY MASS INDEX: 31.51 KG/M2 | DIASTOLIC BLOOD PRESSURE: 64 MMHG | SYSTOLIC BLOOD PRESSURE: 114 MMHG | WEIGHT: 156 LBS

## 2021-09-21 DIAGNOSIS — Z3A.37 37 WEEKS GESTATION OF PREGNANCY: Primary | ICD-10-CM

## 2021-09-21 DIAGNOSIS — O26.893 RH NEGATIVE STATUS DURING PREGNANCY IN THIRD TRIMESTER: ICD-10-CM

## 2021-09-21 DIAGNOSIS — O09.93 SUPERVISION OF HIGH RISK PREGNANCY IN THIRD TRIMESTER: ICD-10-CM

## 2021-09-21 DIAGNOSIS — Z67.91 RH NEGATIVE STATUS DURING PREGNANCY IN THIRD TRIMESTER: ICD-10-CM

## 2021-09-21 DIAGNOSIS — A04.8 H. PYLORI INFECTION: ICD-10-CM

## 2021-09-21 PROCEDURE — 99212 OFFICE O/P EST SF 10 MIN: CPT | Performed by: NURSE PRACTITIONER

## 2021-09-21 RX ORDER — FLUTICASONE PROPIONATE 50 MCG
SPRAY, SUSPENSION (ML) NASAL
COMMUNITY
Start: 2021-09-20 | End: 2022-03-23 | Stop reason: SDUPTHER

## 2021-09-21 RX ORDER — NYSTATIN 100000 [USP'U]/G
POWDER TOPICAL
COMMUNITY
Start: 2021-09-20 | End: 2021-10-26

## 2021-09-21 NOTE — PROGRESS NOTES
CC: Prenatal visit    Marcie Roberts is a 21 y.o.  at 37w5d.  Doing well.  No complaints.  Denies contractions, LOF, or VB.  Reports good FM.    /64   Wt 70.8 kg (156 lb)   LMP 2020 (Exact Date)   BMI 31.51 kg/m²             3/21 Problems (from 21 to present)     Problem Noted Resolved    Rh negative status during pregnancy in third trimester 2021 by Mae Hernandez MD No    Overview Signed 2021 10:02 AM by Mae Hernandez MD     RhoGAM          Supervision of high risk pregnancy in third trimester 3/1/2021 by Billie Mane APRN No    H. pylori infection 3/1/2021 by Billie Mane APRN No    Overview Signed 2021 11:27 AM by Mae Hernandez MD     Carafate, Nexium         Nausea and vomiting during pregnancy prior to 22 weeks gestation 3/1/2021 by Billie Mane, ZO 2021 by Billie Mane APRN    Maternal tobacco use in first trimester 3/1/2021 by Billie Mane, APRN 3/29/2021 by Billie Mane APRN          A/P: Marcie Roberts is a 21 y.o.  at 37w5d.  - RTC in 1 weeks     Diagnosis Plan   1. 37 weeks gestation of pregnancy     2. Supervision of high risk pregnancy in third trimester     3. Rh negative status during pregnancy in third trimester     4. H. pylori infection         ZO Rodríguez  2021  11:14 CDT

## 2021-09-28 ENCOUNTER — ROUTINE PRENATAL (OUTPATIENT)
Dept: OBSTETRICS AND GYNECOLOGY | Facility: CLINIC | Age: 21
End: 2021-09-28

## 2021-09-28 VITALS — WEIGHT: 158 LBS | BODY MASS INDEX: 31.91 KG/M2 | DIASTOLIC BLOOD PRESSURE: 62 MMHG | SYSTOLIC BLOOD PRESSURE: 104 MMHG

## 2021-09-28 DIAGNOSIS — Z3A.38 38 WEEKS GESTATION OF PREGNANCY: ICD-10-CM

## 2021-09-28 DIAGNOSIS — O09.93 SUPERVISION OF HIGH RISK PREGNANCY IN THIRD TRIMESTER: Primary | ICD-10-CM

## 2021-09-28 DIAGNOSIS — Z67.91 RH NEGATIVE STATUS DURING PREGNANCY IN THIRD TRIMESTER: ICD-10-CM

## 2021-09-28 DIAGNOSIS — A04.8 H. PYLORI INFECTION: ICD-10-CM

## 2021-09-28 DIAGNOSIS — O26.893 RH NEGATIVE STATUS DURING PREGNANCY IN THIRD TRIMESTER: ICD-10-CM

## 2021-09-28 PROCEDURE — 99212 OFFICE O/P EST SF 10 MIN: CPT | Performed by: OBSTETRICS & GYNECOLOGY

## 2021-09-28 NOTE — PROGRESS NOTES
CC: Prenatal visit    Marcie Roberts is a 21 y.o.  at 38w5d.  Doing well other than feeling a lot of pressure.  Denies contractions, LOF, or VB.  Reports good FM.    /62   Wt 71.7 kg (158 lb)   LMP 2020 (Exact Date)   BMI 31.91 kg/m²   SVE: 2/50/-3/med/mid, vertex on Leopold's  Fundal Height (cm): 35 cm  Fetal Heart Rate: 162    3/21 Problems (from 21 to present)     Problem Noted Resolved    Rh negative status during pregnancy in third trimester 2021 by Mae Hernandez MD No    Overview Signed 2021 10:02 AM by Mae Hernandez MD     RhoGAM          Supervision of high risk pregnancy in third trimester 3/1/2021 by Billie Mane APRN No    H. pylori infection 3/1/2021 by Billie Mane APRN No    Overview Signed 2021 11:27 AM by Mae Hernandez MD     Carafate, Nexium         Nausea and vomiting during pregnancy prior to 22 weeks gestation 3/1/2021 by Billie Mane APRN 2021 by Billie Mane APRN    Maternal tobacco use in first trimester 3/1/2021 by Billie Mane APRN 3/29/2021 by Billie Mane APRN          A/P: Marcie Roberts is a 21 y.o.  at 38w5d.  - RTC in 1 week  - Declines EIOL  - Discussed if that no labor by next week, recommend IOL at 41 wks; patient agreeable.  Will schedule at next visit.  - Reviewed COVID-19 visitation policy  - Reviewed COVID-19 precautions     Diagnosis Plan   1. Supervision of high risk pregnancy in third trimester     2. Rh negative status during pregnancy in third trimester     3. H. pylori infection     4. 38 weeks gestation of pregnancy       Mae Hernandez MD  2021  09:21 CDT   None sent

## 2021-10-02 ENCOUNTER — HOSPITAL ENCOUNTER (OUTPATIENT)
Facility: HOSPITAL | Age: 21
Discharge: HOME | End: 2021-10-02
Attending: OBSTETRICS & GYNECOLOGY | Admitting: OBSTETRICS & GYNECOLOGY

## 2021-10-02 VITALS
HEART RATE: 77 BPM | TEMPERATURE: 99.5 F | SYSTOLIC BLOOD PRESSURE: 130 MMHG | DIASTOLIC BLOOD PRESSURE: 81 MMHG | OXYGEN SATURATION: 100 % | RESPIRATION RATE: 18 BRPM

## 2021-10-02 LAB
BACTERIA UR QL AUTO: ABNORMAL /HPF
BILIRUB UR QL STRIP: NEGATIVE
CANDIDA ALBICANS: NEGATIVE
CLARITY UR: ABNORMAL
COLOR UR: YELLOW
GARDNERELLA VAGINALIS: NEGATIVE
GLUCOSE UR STRIP-MCNC: NEGATIVE MG/DL
HGB UR QL STRIP.AUTO: NEGATIVE
HYALINE CASTS UR QL AUTO: ABNORMAL /LPF
KETONES UR QL STRIP: NEGATIVE
LEUKOCYTE ESTERASE UR QL STRIP.AUTO: ABNORMAL
NITRITE UR QL STRIP: NEGATIVE
PH UR STRIP.AUTO: 6 [PH] (ref 5–9)
PROT UR QL STRIP: NEGATIVE
RBC # UR: ABNORMAL /HPF
REF LAB TEST METHOD: ABNORMAL
SP GR UR STRIP: 1.01 (ref 1–1.03)
SQUAMOUS #/AREA URNS HPF: ABNORMAL /HPF
T VAGINALIS DNA VAG QL PROBE+SIG AMP: NEGATIVE
UROBILINOGEN UR QL STRIP: ABNORMAL
WBC UR QL AUTO: ABNORMAL /HPF

## 2021-10-02 PROCEDURE — 87660 TRICHOMONAS VAGIN DIR PROBE: CPT | Performed by: OBSTETRICS & GYNECOLOGY

## 2021-10-02 PROCEDURE — 87510 GARDNER VAG DNA DIR PROBE: CPT | Performed by: OBSTETRICS & GYNECOLOGY

## 2021-10-02 PROCEDURE — G0463 HOSPITAL OUTPT CLINIC VISIT: HCPCS

## 2021-10-02 PROCEDURE — 59025 FETAL NON-STRESS TEST: CPT

## 2021-10-02 PROCEDURE — 87480 CANDIDA DNA DIR PROBE: CPT | Performed by: OBSTETRICS & GYNECOLOGY

## 2021-10-02 PROCEDURE — 59025 FETAL NON-STRESS TEST: CPT | Performed by: OBSTETRICS & GYNECOLOGY

## 2021-10-02 PROCEDURE — 81001 URINALYSIS AUTO W/SCOPE: CPT | Performed by: OBSTETRICS & GYNECOLOGY

## 2021-10-02 NOTE — NON STRESS TEST
Marcie Roberts, a  at 39w2d with an VJ of 10/7/2021, by Last Menstrual Period, was seen at [facility] for a nonstress test.    Chief Complaint   Patient presents with   • Leaking Fluid     pt states she has been leaking fluids since 1100 on 10-; pt reports clear fluid with no odor; pt reports regular contractions; pt reports positive fetal movement; pt denies vaginal bleeding       Patient Active Problem List   Diagnosis   • Supervision of high risk pregnancy in third trimester   • H. pylori infection   • Rh negative status during pregnancy in third trimester       Start Time: 0530  Stop Time: 0600    Interpretation A  Nonstress Test Interpretation A: Reactive (10/02/21 0600 : Kimberly Amaral, RN)  Comments A: reviewed with Dr. Hernandez (10/02/21 0600 : Kimberly Amaral, RN)          
Alert and oriented to person, place and time

## 2021-10-02 NOTE — DISCHARGE INSTRUCTIONS
Return to hospital for intense/regular contractions; water breaks; bright red vaginal bleeding; decreased fetal movement. Call primary provider for any questions or concerns.

## 2021-10-05 ENCOUNTER — ROUTINE PRENATAL (OUTPATIENT)
Dept: OBSTETRICS AND GYNECOLOGY | Facility: CLINIC | Age: 21
End: 2021-10-05

## 2021-10-05 VITALS — SYSTOLIC BLOOD PRESSURE: 120 MMHG | WEIGHT: 160 LBS | BODY MASS INDEX: 32.32 KG/M2 | DIASTOLIC BLOOD PRESSURE: 72 MMHG

## 2021-10-05 DIAGNOSIS — O26.893 RH NEGATIVE STATUS DURING PREGNANCY IN THIRD TRIMESTER: ICD-10-CM

## 2021-10-05 DIAGNOSIS — A04.8 H. PYLORI INFECTION: ICD-10-CM

## 2021-10-05 DIAGNOSIS — O09.93 SUPERVISION OF HIGH RISK PREGNANCY IN THIRD TRIMESTER: ICD-10-CM

## 2021-10-05 DIAGNOSIS — Z3A.39 39 WEEKS GESTATION OF PREGNANCY: Primary | ICD-10-CM

## 2021-10-05 DIAGNOSIS — Z67.91 RH NEGATIVE STATUS DURING PREGNANCY IN THIRD TRIMESTER: ICD-10-CM

## 2021-10-05 PROCEDURE — 99212 OFFICE O/P EST SF 10 MIN: CPT | Performed by: NURSE PRACTITIONER

## 2021-10-05 NOTE — PROGRESS NOTES
CC: Prenatal visit    Marcie Roberts is a 21 y.o.  at 39w5d.  Doing well.  No complaints.  Denies contractions, LOF, or VB.  Reports good FM.    /72   Wt 72.6 kg (160 lb)   LMP 2020 (Exact Date)   BMI 32.32 kg/m²   SVE: /-3          3/21 Problems (from 21 to present)     Problem Noted Resolved    Rh negative status during pregnancy in third trimester 2021 by Mae Hernandez MD No    Overview Signed 2021 10:02 AM by Mae Hernandez MD     RhoGAM          Supervision of high risk pregnancy in third trimester 3/1/2021 by Billie Mane APRN No    H. pylori infection 3/1/2021 by Billie Mnae APRN No    Overview Signed 2021 11:27 AM by Mae Hernandez MD     Carafate, Nexium         Nausea and vomiting during pregnancy prior to 22 weeks gestation 3/1/2021 by Billie Mane, ZO 2021 by Billie Mane APRN    Maternal tobacco use in first trimester 3/1/2021 by Billie Mane, APRN 3/29/2021 by Billie Mane APRN          A/P: Marcie Roberts is a 21 y.o.  at 39w5d.  Tried to schedule pt for IOL due to her GA, but we could not come to agreement on provider or date.  She prefers female delivering provider if possible.    - RTC on 10/11/2021 to schedule IOL     Diagnosis Plan   1. 39 weeks gestation of pregnancy     2. Supervision of high risk pregnancy in third trimester     3. Rh negative status during pregnancy in third trimester     4. H. pylori infection         ZO Rodríguez  10/5/2021  10:58 CDT

## 2021-10-10 ENCOUNTER — HOSPITAL ENCOUNTER (INPATIENT)
Facility: HOSPITAL | Age: 21
LOS: 2 days | Discharge: HOME | End: 2021-10-12
Attending: OBSTETRICS & GYNECOLOGY | Admitting: OBSTETRICS & GYNECOLOGY

## 2021-10-10 DIAGNOSIS — B96.81 HELICOBACTER PYLORI GASTRITIS: ICD-10-CM

## 2021-10-10 DIAGNOSIS — K29.70 HELICOBACTER PYLORI GASTRITIS: ICD-10-CM

## 2021-10-10 PROBLEM — Z37.9 NORMAL LABOR: Status: ACTIVE | Noted: 2021-10-10

## 2021-10-10 LAB
DEPRECATED RDW RBC AUTO: 40.2 FL (ref 37–54)
ERYTHROCYTE [DISTWIDTH] IN BLOOD BY AUTOMATED COUNT: 12.9 % (ref 12.3–15.4)
FLUAV SUBTYP SPEC NAA+PROBE: NOT DETECTED
FLUBV RNA ISLT QL NAA+PROBE: NOT DETECTED
HCT VFR BLD AUTO: 38.4 % (ref 34–46.6)
HGB BLD-MCNC: 13.1 G/DL (ref 12–15.9)
MCH RBC QN AUTO: 29.6 PG (ref 26.6–33)
MCHC RBC AUTO-ENTMCNC: 34.1 G/DL (ref 31.5–35.7)
MCV RBC AUTO: 86.7 FL (ref 79–97)
PLATELET # BLD AUTO: 355 10*3/MM3 (ref 140–450)
PMV BLD AUTO: 9.9 FL (ref 6–12)
RBC # BLD AUTO: 4.43 10*6/MM3 (ref 3.77–5.28)
SARS-COV-2 RNA PNL SPEC NAA+PROBE: DETECTED
WBC # BLD AUTO: 14.28 10*3/MM3 (ref 3.4–10.8)

## 2021-10-10 PROCEDURE — 86901 BLOOD TYPING SEROLOGIC RH(D): CPT | Performed by: OBSTETRICS & GYNECOLOGY

## 2021-10-10 PROCEDURE — S0260 H&P FOR SURGERY: HCPCS | Performed by: OBSTETRICS & GYNECOLOGY

## 2021-10-10 PROCEDURE — 87636 SARSCOV2 & INF A&B AMP PRB: CPT | Performed by: OBSTETRICS & GYNECOLOGY

## 2021-10-10 PROCEDURE — 86850 RBC ANTIBODY SCREEN: CPT | Performed by: OBSTETRICS & GYNECOLOGY

## 2021-10-10 PROCEDURE — 86900 BLOOD TYPING SEROLOGIC ABO: CPT | Performed by: OBSTETRICS & GYNECOLOGY

## 2021-10-10 PROCEDURE — 85027 COMPLETE CBC AUTOMATED: CPT | Performed by: OBSTETRICS & GYNECOLOGY

## 2021-10-10 RX ORDER — SODIUM CHLORIDE, SODIUM LACTATE, POTASSIUM CHLORIDE, CALCIUM CHLORIDE 600; 310; 30; 20 MG/100ML; MG/100ML; MG/100ML; MG/100ML
125 INJECTION, SOLUTION INTRAVENOUS CONTINUOUS
Status: DISCONTINUED | OUTPATIENT
Start: 2021-10-10 | End: 2021-10-11

## 2021-10-10 RX ORDER — SODIUM CHLORIDE 0.9 % (FLUSH) 0.9 %
10 SYRINGE (ML) INJECTION AS NEEDED
Status: DISCONTINUED | OUTPATIENT
Start: 2021-10-10 | End: 2021-10-11 | Stop reason: HOSPADM

## 2021-10-10 RX ORDER — SODIUM CHLORIDE 0.9 % (FLUSH) 0.9 %
10 SYRINGE (ML) INJECTION EVERY 12 HOURS SCHEDULED
Status: DISCONTINUED | OUTPATIENT
Start: 2021-10-10 | End: 2021-10-11 | Stop reason: HOSPADM

## 2021-10-10 RX ORDER — LIDOCAINE HYDROCHLORIDE 10 MG/ML
5 INJECTION, SOLUTION EPIDURAL; INFILTRATION; INTRACAUDAL; PERINEURAL AS NEEDED
Status: DISCONTINUED | OUTPATIENT
Start: 2021-10-10 | End: 2021-10-11 | Stop reason: HOSPADM

## 2021-10-10 RX ORDER — SODIUM CHLORIDE, SODIUM LACTATE, POTASSIUM CHLORIDE, CALCIUM CHLORIDE 600; 310; 30; 20 MG/100ML; MG/100ML; MG/100ML; MG/100ML
INJECTION, SOLUTION INTRAVENOUS
Status: COMPLETED
Start: 2021-10-10 | End: 2021-10-10

## 2021-10-10 RX ORDER — LIDOCAINE HYDROCHLORIDE 10 MG/ML
INJECTION, SOLUTION EPIDURAL; INFILTRATION; INTRACAUDAL; PERINEURAL
Status: DISCONTINUED
Start: 2021-10-10 | End: 2021-10-10 | Stop reason: WASHOUT

## 2021-10-10 RX ADMIN — LIDOCAINE HYDROCHLORIDE 5 ML: 10 INJECTION, SOLUTION EPIDURAL; INFILTRATION; INTRACAUDAL; PERINEURAL at 22:27

## 2021-10-10 RX ADMIN — SODIUM CHLORIDE, POTASSIUM CHLORIDE, SODIUM LACTATE AND CALCIUM CHLORIDE 1000 ML: 600; 310; 30; 20 INJECTION, SOLUTION INTRAVENOUS at 22:05

## 2021-10-11 LAB
ABO GROUP BLD: NORMAL
AMPHET+METHAMPHET UR QL: NEGATIVE
AMPHETAMINES UR QL: NEGATIVE
BARBITURATES UR QL SCN: NEGATIVE
BASOPHILS # BLD AUTO: 0.03 10*3/MM3 (ref 0–0.2)
BASOPHILS NFR BLD AUTO: 0.2 % (ref 0–1.5)
BENZODIAZ UR QL SCN: NEGATIVE
BLD GP AB SCN SERPL QL: NEGATIVE
BUPRENORPHINE SERPL-MCNC: NEGATIVE NG/ML
CANNABINOIDS SERPL QL: NEGATIVE
COCAINE UR QL: NEGATIVE
DEPRECATED RDW RBC AUTO: 41.1 FL (ref 37–54)
EOSINOPHIL # BLD AUTO: 0.01 10*3/MM3 (ref 0–0.4)
EOSINOPHIL NFR BLD AUTO: 0.1 % (ref 0.3–6.2)
ERYTHROCYTE [DISTWIDTH] IN BLOOD BY AUTOMATED COUNT: 12.9 % (ref 12.3–15.4)
HCT VFR BLD AUTO: 36.3 % (ref 34–46.6)
HGB BLD-MCNC: 12.4 G/DL (ref 12–15.9)
IMM GRANULOCYTES # BLD AUTO: 0.06 10*3/MM3 (ref 0–0.05)
IMM GRANULOCYTES NFR BLD AUTO: 0.4 % (ref 0–0.5)
LYMPHOCYTES # BLD AUTO: 1.36 10*3/MM3 (ref 0.7–3.1)
LYMPHOCYTES NFR BLD AUTO: 10 % (ref 19.6–45.3)
Lab: NORMAL
MCH RBC QN AUTO: 30 PG (ref 26.6–33)
MCHC RBC AUTO-ENTMCNC: 34.2 G/DL (ref 31.5–35.7)
MCV RBC AUTO: 87.9 FL (ref 79–97)
METHADONE UR QL SCN: NEGATIVE
MONOCYTES # BLD AUTO: 0.76 10*3/MM3 (ref 0.1–0.9)
MONOCYTES NFR BLD AUTO: 5.6 % (ref 5–12)
NEUTROPHILS NFR BLD AUTO: 11.44 10*3/MM3 (ref 1.7–7)
NEUTROPHILS NFR BLD AUTO: 83.7 % (ref 42.7–76)
NRBC BLD AUTO-RTO: 0 /100 WBC (ref 0–0.2)
OPIATES UR QL: NEGATIVE
OXYCODONE UR QL SCN: NEGATIVE
PCP UR QL SCN: NEGATIVE
PLATELET # BLD AUTO: 287 10*3/MM3 (ref 140–450)
PMV BLD AUTO: 10.3 FL (ref 6–12)
PROPOXYPH UR QL: NEGATIVE
RBC # BLD AUTO: 4.13 10*6/MM3 (ref 3.77–5.28)
RH BLD: NEGATIVE
T&S EXPIRATION DATE: NORMAL
TRICYCLICS UR QL SCN: NEGATIVE
WBC # BLD AUTO: 13.66 10*3/MM3 (ref 3.4–10.8)

## 2021-10-11 PROCEDURE — 85025 COMPLETE CBC W/AUTO DIFF WBC: CPT | Performed by: OBSTETRICS & GYNECOLOGY

## 2021-10-11 PROCEDURE — 59410 OBSTETRICAL CARE: CPT | Performed by: OBSTETRICS & GYNECOLOGY

## 2021-10-11 PROCEDURE — 80306 DRUG TEST PRSMV INSTRMNT: CPT | Performed by: OBSTETRICS & GYNECOLOGY

## 2021-10-11 PROCEDURE — 0HQ9XZZ REPAIR PERINEUM SKIN, EXTERNAL APPROACH: ICD-10-PCS | Performed by: OBSTETRICS & GYNECOLOGY

## 2021-10-11 PROCEDURE — 3E033VJ INTRODUCTION OF OTHER HORMONE INTO PERIPHERAL VEIN, PERCUTANEOUS APPROACH: ICD-10-PCS | Performed by: OBSTETRICS & GYNECOLOGY

## 2021-10-11 RX ORDER — OXYTOCIN/0.9 % SODIUM CHLORIDE 30/500 ML
650 PLASTIC BAG, INJECTION (ML) INTRAVENOUS ONCE
Status: DISCONTINUED | OUTPATIENT
Start: 2021-10-11 | End: 2021-10-12

## 2021-10-11 RX ORDER — IBUPROFEN 800 MG/1
800 TABLET ORAL EVERY 8 HOURS PRN
Status: DISCONTINUED | OUTPATIENT
Start: 2021-10-11 | End: 2021-10-12 | Stop reason: HOSPADM

## 2021-10-11 RX ORDER — CARBOPROST TROMETHAMINE 250 UG/ML
250 INJECTION, SOLUTION INTRAMUSCULAR ONCE
Status: DISCONTINUED | OUTPATIENT
Start: 2021-10-11 | End: 2021-10-12

## 2021-10-11 RX ORDER — DOCUSATE SODIUM 100 MG/1
100 CAPSULE, LIQUID FILLED ORAL 2 TIMES DAILY
Status: DISCONTINUED | OUTPATIENT
Start: 2021-10-11 | End: 2021-10-12 | Stop reason: HOSPADM

## 2021-10-11 RX ORDER — OXYTOCIN/0.9 % SODIUM CHLORIDE 30/500 ML
85 PLASTIC BAG, INJECTION (ML) INTRAVENOUS ONCE
Status: DISCONTINUED | OUTPATIENT
Start: 2021-10-11 | End: 2021-10-12

## 2021-10-11 RX ORDER — DIPHENHYDRAMINE HCL 25 MG
25 CAPSULE ORAL NIGHTLY PRN
Status: DISCONTINUED | OUTPATIENT
Start: 2021-10-11 | End: 2021-10-12 | Stop reason: HOSPADM

## 2021-10-11 RX ORDER — BISACODYL 10 MG
10 SUPPOSITORY, RECTAL RECTAL DAILY PRN
Status: DISCONTINUED | OUTPATIENT
Start: 2021-10-11 | End: 2021-10-12 | Stop reason: HOSPADM

## 2021-10-11 RX ORDER — MISOPROSTOL 200 UG/1
600 TABLET ORAL ONCE
Status: DISCONTINUED | OUTPATIENT
Start: 2021-10-11 | End: 2021-10-12

## 2021-10-11 RX ORDER — SODIUM CHLORIDE 0.9 % (FLUSH) 0.9 %
1-10 SYRINGE (ML) INJECTION AS NEEDED
Status: DISCONTINUED | OUTPATIENT
Start: 2021-10-11 | End: 2021-10-12 | Stop reason: HOSPADM

## 2021-10-11 RX ORDER — CARBOPROST TROMETHAMINE 250 UG/ML
250 INJECTION, SOLUTION INTRAMUSCULAR
Status: DISCONTINUED | OUTPATIENT
Start: 2021-10-11 | End: 2021-10-12 | Stop reason: HOSPADM

## 2021-10-11 RX ORDER — HYDROCODONE BITARTRATE AND ACETAMINOPHEN 5; 325 MG/1; MG/1
1 TABLET ORAL EVERY 4 HOURS PRN
Status: DISCONTINUED | OUTPATIENT
Start: 2021-10-11 | End: 2021-10-12 | Stop reason: HOSPADM

## 2021-10-11 RX ORDER — MISOPROSTOL 200 UG/1
800 TABLET ORAL ONCE AS NEEDED
Status: DISCONTINUED | OUTPATIENT
Start: 2021-10-11 | End: 2021-10-12 | Stop reason: HOSPADM

## 2021-10-11 RX ORDER — METHYLERGONOVINE MALEATE 0.2 MG/ML
200 INJECTION INTRAVENOUS ONCE AS NEEDED
Status: DISCONTINUED | OUTPATIENT
Start: 2021-10-11 | End: 2021-10-12 | Stop reason: HOSPADM

## 2021-10-11 RX ORDER — LANOLIN 100 %
OINTMENT (GRAM) TOPICAL
Status: DISCONTINUED | OUTPATIENT
Start: 2021-10-11 | End: 2021-10-12 | Stop reason: HOSPADM

## 2021-10-11 RX ORDER — HYDROCORTISONE 25 MG/G
1 CREAM TOPICAL AS NEEDED
Status: DISCONTINUED | OUTPATIENT
Start: 2021-10-11 | End: 2021-10-12 | Stop reason: HOSPADM

## 2021-10-11 RX ORDER — PRENATAL VIT/IRON FUM/FOLIC AC 27MG-0.8MG
1 TABLET ORAL DAILY
Status: DISCONTINUED | OUTPATIENT
Start: 2021-10-11 | End: 2021-10-12 | Stop reason: HOSPADM

## 2021-10-11 RX ADMIN — HYDROCODONE BITARTRATE AND ACETAMINOPHEN 1 TABLET: 5; 325 TABLET ORAL at 16:28

## 2021-10-11 RX ADMIN — Medication: at 20:37

## 2021-10-11 RX ADMIN — DOCUSATE SODIUM 100 MG: 100 CAPSULE, LIQUID FILLED ORAL at 20:37

## 2021-10-11 RX ADMIN — DOCUSATE SODIUM 100 MG: 100 CAPSULE, LIQUID FILLED ORAL at 10:56

## 2021-10-11 RX ADMIN — PRENATAL VIT W/ FE FUMARATE-FA TAB 27-0.8 MG 1 TABLET: 27-0.8 TAB at 10:56

## 2021-10-11 RX ADMIN — HYDROCODONE BITARTRATE AND ACETAMINOPHEN 1 TABLET: 5; 325 TABLET ORAL at 10:56

## 2021-10-11 RX ADMIN — BENZOCAINE AND LEVOMENTHOL 1 APPLICATION: 200; 5 SPRAY TOPICAL at 05:09

## 2021-10-11 RX ADMIN — IBUPROFEN 800 MG: 800 TABLET, FILM COATED ORAL at 05:09

## 2021-10-11 RX ADMIN — IBUPROFEN 800 MG: 800 TABLET, FILM COATED ORAL at 20:38

## 2021-10-11 NOTE — PROGRESS NOTES
[facility]  : 2000  MRN: [id number]  CSN: [id number]    Postpartum Day #1  Subjective   Patient is overall stable this morning, she is ambulating without difficulty, tolerating a regular diet.  Jenkins catheter has been removed and she is urinating without difficulty.  Patient states that she has not had a bowel movement yet.  Pain is well controlled this morning.      Objective     Min/max vitals past 24 hours:   Temp  Min: 97.9 °F (36.6 °C)  Max: 98.2 °F (36.8 °C)  BP  Min: 131/72  Max: 155/95  Pulse  Min: 68  Max: 82  Pulse  Min: 68  Max: 82        Abdomen: soft, nontender, bowel sounds normal, no masses   fundus firm and nontender   Calves: Nontender, no cords palpable   Pelvic: deferred     Lab Results   Component Value Date    WBC 13.66 (H) 10/11/2021    HGB 12.4 10/11/2021    HCT 36.3 10/11/2021    MCV 87.9 10/11/2021     10/11/2021    RH Negative 10/10/2021    HEPBSAG Non-Reactive 2021        Assessment   1. Postpartum Day #1 S/P vaginal delivery, doing well and recovering appropriately at this time.  Vital signs reviewed and reassuring.     Plan   1. Continue routine postpartum care  2. Encourage ambulation and breastfeeding  3. Regular diet  4. Continue current pain control regimen          This document has been electronically signed by Naseem Merritt DO on 2021 12:12 CDT

## 2021-10-11 NOTE — NURSING NOTE
Patients covid test resulted positive. When patient was informed she stated she was positive back in August.  Upon admission patient stated no to covid screening however she was under duress with labor pains. Patient is currently asymptomatic.   Informed  House Sup of the results who stated that isolation is decided by the physician.  Dr. Merritt was notified that patient was positive and that she stated she was positive back in August.  Dr. Merritt was informed that House Sup stated it is up to his clinical decision regarding isolation.  Dr. Merritt explained that since she was positive back in August she does not need isolation now.  He requested that we try to get those results from that clinic.  Arleen Castaneda, Unit Director was informed of the situation.  She reinterated that isolation is not needed but that staff should probably take precaution and for us to try to get those original test results.

## 2021-10-11 NOTE — L&D DELIVERY NOTE
[facility]  Vaginal Delivery Note    Delivery     Delivery: Vaginal, Spontaneous     YOB: 2022    Time of Birth:  Gestational Age 10:18 PM   40w3d     Anesthesia: None     Delivering clinician: Naseem Merritt    Forceps?   No   Vacuum? No    Shoulder dystocia present: No        Delivery narrative:  Patient pushed to deliver a viable 3310 g female from the OA position over an intact perineum on 10/11/2021 at 2222.  Now noted nuchal cord.  Left/ anterior shoulder was delivered with ease, followed by right posterior shoulder.  Body was then delivered with gentle traction.  Mouth and nose bulb suctioned.  3 vessel cord clamped x2 and cut after 30 seconds of delayed clamping.  Baby was placed on mother's chest.Cord blood was obtained and sent. Placenta delivered spontaneously intact and Pitocin was started.  Cervix, vagina, and perineum were examined and a 1st degree laceration was noted.  3-0 Vicryl was used to repair the laceration in a routine fashion with good hemostasis.   mL; QBL pending, please see nursing documentation.  Apgar scores 9/9.  Mother and infant stable.    Infant    Findings: female  infant     Infant observations: Weight: 3310 g (7 lb 4.8 oz)   Length: 19.5  in  Observations/Comments:        Apgars: 9  @ 1 minute /    9  @ 5 minutes         Placenta, Cord, and Fluid    Placenta delivered  Spontaneous  at   10/10/2021 10:22 PM     Cord: 3 vessels  present.   Nuchal Cord?  no   Cord blood obtained: Yes    Cord gases obtained:  No    Cord gas results: Venous:  No results found for: PHCVEN    Arterial:  No results found for: PHCART     Repair    Episiotomy: None     No    Lacerations: Yes  Laceration Information  Laceration Repaired?   Perineal: 1st  Yes    Periurethral:       Labial:       Sulcus:       Vaginal:       Cervical:         Suture used for repair: 3-0 Vicryl   Estimated Blood Loss: Est. Blood Loss (mL): 200 mL (Filed from Delivery Summary) (10/10/21  2213)           Complications  none    Disposition  Mother to Mother Baby/Postpartum  in stable condition currently.  Baby to remains with mom  in stable condition currently.      Naseem Merritt DO  10/11/21  11:38 CDT

## 2021-10-11 NOTE — H&P
[facility]  Obstetric History and Physical    Chief Complaint   Patient presents with   • Rupture of Membranes     Pt reports ROM at about 2115 tonight.           Patient is a 21 y.o. female  currently at 40w3d, who presents with complaint of contractions of the been getting stronger and closer together since late this evening.  States positive fetal movement, denies vaginal bleeding.  States that her water.  Broke at approximately 915 this evening.  No other concerns or complaints at this time    Her prenatal care is complicated by Rh- status, maternal tobacco use abuse.     Obstetric History   # 1 - Date: 04/20/15, Sex: Female, Weight: 2892 g (6 lb 6 oz), GA: 40w6d, Delivery: Vaginal, Spontaneous, Apgar1: None, Apgar5: None, Living: Living, Birth Comments: None    # 2 - Date: None, Sex: None, Weight: None, GA: None, Delivery: None, Apgar1: None, Apgar5: None, Living: None, Birth Comments: None      The following portions of the patient's history were reviewed and updated as appropriate: current medications, allergies, past medical history, past surgical history, past family history, past social history and problem list .       Prenatal Information:  Prenatal Results     POC Urine Glucose/Protein     Test Value Reference Range Date Time    Urine Glucose        Urine Protein              Initial Prenatal Labs     Test Value Reference Range Date Time    Hemoglobin  13.9 g/dL 12.0 - 15.9 21 1307    Hematocrit  39.7 % 34.0 - 46.6 21 1307    Platelets  358 10*3/mm3 140 - 450 21 0934       362 10*3/mm3 140 - 450 21 1307    Rubella IgG  Equivocal   21 1307    Hepatitis B SAg  Non-Reactive  Non-Reactive 21 1307    Hepatitis C Ab  Non-Reactive  Non-Reactive 21 1307    RPR  Non-Reactive  Non-Reactive 21 1307    ABO  A   21 1307    Rh  Negative   21 1307    Antibody Screen  Negative   21 1307    HIV  Non-Reactive  Non-Reactive 21 1307    Urine  Culture  25,000 CFU/mL Mixed Antoinette Isolated   03/01/21 1318    Gonorrhea  Negative  Negative 03/01/21 1318    Chlamydia  Negative  Negative 03/01/21 1318    TSH              2nd and 3rd Trimester     Test Value Reference Range Date Time    Hemoglobin (repeated)  12.4 g/dL 12.0 - 15.9 07/19/21 0934    Hematocrit (repeated)  35.4 % 34.0 - 46.6 07/19/21 0934    GCT  127 mg/dL 65 - 139 07/19/21 0934    Antibody Screen (repeated)        GTT Fasting        GTT 1 Hr        GTT 2 Hr        GTT 3 Hr        Group B Strep  Negative  Negative 09/14/21 1147          Drug Screening     Test Value Reference Range Date Time    Amphetamine Screen  Negative  Negative 03/01/21 1318    Barbiturate Screen  Negative  Negative 03/01/21 1318    Benzodiazepine Screen  Negative  Negative 03/01/21 1318    Methadone Screen  Negative  Negative 03/01/21 1318    Phencyclidine Screen  Negative  Negative 03/01/21 1318    Opiates Screen  Negative  Negative 03/01/21 1318    THC Screen  Negative  Negative 03/01/21 1318    Cocaine Screen  Negative  Negative 03/01/21 1318    Propoxyphene Screen  Negative  Negative 03/01/21 1318    Buprenorphine Screen  Negative  Negative 03/01/21 1318    Methamphetamine Screen  Negative  Negative 03/01/21 1318    Oxycodone Screen  Negative  Negative 03/01/21 1318    Tricyclic Antidepressants Screen  Negative  Negative 03/01/21 1318          Other (Risk screening)     Test Value Reference Range Date Time    Varicella IgG        Parvovirus IgG        CMV IgG        Cystic Fibrosis        Hemoglobin electrophoresis        NIPT        MSAFP-4        AFP (for NTD only)              Legend    ^: Historical                      External Prenatal Results     Pregnancy Outside Results - Transcribed From Office Records - See Scanned Records For Details     Test Value Date Time    ABO  A  03/01/21 1307    Rh  Negative  03/01/21 1307    Antibody Screen  Negative  03/01/21 1307    Varicella IgG  678 index 11/11/14 0953    Rubella   Equivocal  21 1307    Hgb  12.4 g/dL 21 0934       13.9 g/dL 21 1307    Hct  35.4 % 21 0934       39.7 % 21 1307    Glucose Fasting GTT       Glucose Tolerance Test 1 hour       Glucose Tolerance Test 3 hour       Gonorrhea (discrete)  Negative  21 1318    Chlamydia (discrete)  Negative  21 1318    RPR  Non-Reactive  21 1307    VDRL       Syphilis Antibody       HBsAg  Non-Reactive  21 1307    Herpes Simplex Virus PCR       Herpes Simplex VIrus Culture       HIV  Non-Reactive  21 1307    Hep C RNA Quant PCR       Hep C Antibody  Non-Reactive  21 1307    AFP       Group B Strep  Negative  21 1147    GBS Susceptibility to Clindamycin       GBS Susceptibility to Erythromycin       Fetal Fibronectin       Genetic Testing, Maternal Blood             Drug Screening     Test Value Date Time    Urine Drug Screen       Amphetamine Screen  Negative  21 1318    Barbiturate Screen  Negative  21 1318    Benzodiazepine Screen  Negative  21 1318    Methadone Screen  Negative  21 1318    Phencyclidine Screen  Negative  21 1318    Opiates Screen  Negative  21 1318    THC Screen  Negative  21 1318    Cocaine Screen       Propoxyphene Screen  Negative  21 1318    Buprenorphine Screen  Negative  21 1318    Methamphetamine Screen       Oxycodone Screen  Negative  21 1318    Tricyclic Antidepressants Screen  Negative  21 1318          Legend    ^: Historical                         Past OB History:     OB History    Para Term  AB Living   2 1 1 0 0 1   SAB IAB Ectopic Molar Multiple Live Births   0 0 0 0 0 1      # Outcome Date GA Lbr Zenon/2nd Weight Sex Delivery Anes PTL Lv   2 Current            1 Term 04/20/15 40w6d  2892 g (6 lb 6 oz) F Vag-Spont EPI  BRI        ALLERGIES:   No Known Allergies     Home Medications:     Prior to Admission medications    Medication Sig Start Date End  Date Taking? Authorizing Provider   doxylamine (UNISOM) 25 MG tablet Take 1 tablet by mouth every night at bedtime. 3/1/21   Billie Mane APRN   esomeprazole (nexIUM) 40 MG capsule Take 1 capsule by mouth Daily. 5/25/17   Pauline Wong APRN   fluticasone (FLONASE) 50 MCG/ACT nasal spray  9/20/21   Wale Brush MD   loratadine (CLARITIN) 10 MG tablet  9/8/21   Wale Brush MD   magnesium oxide (MAGOX) 400 (241.3 Mg) MG tablet tablet Take 1 tablet by mouth Daily. 4/28/21   Billie Mane APRN   nystatin (MYCOSTATIN) 095543 UNIT/GM powder  9/20/21   ProviderWale MD   omeprazole (priLOSEC) 20 MG capsule Take 1 capsule by mouth Daily. 5/25/17   Pauline Wong APRN   Oxymetazoline HCl (MUCINEX NASAL SPRAY MOISTURE NA) into the nostril(s) as directed by provider.    ProviderWale MD   Prenatal Vit-Fe Fumarate-FA (prenatal vitamin 27-0.8) 27-0.8 MG tablet tablet Take  by mouth Daily.    ProviderWale MD   vitamin B-6 (PYRIDOXINE) 100 MG tablet Take 1 tablet by mouth every night at bedtime. 3/1/21   Billie Mane APRN       Past Medical History: Past Medical History:   Diagnosis Date   • ADHD    • H. pylori infection       Past Surgical History Past Surgical History:   Procedure Laterality Date   • APPENDECTOMY     • CHOLECYSTECTOMY     • EAR TUBES     • ENDOSCOPY  04/07/2017    Focal active chronic gastritis, Few Helicobacter organisms Dr. Moses   • LAPAROSCOPIC CHOLECYSTECTOMY     • TONSILLECTOMY AND ADENOIDECTOMY     • WISDOM TOOTH EXTRACTION        Family History: Family History   Problem Relation Age of Onset   • ADD / ADHD Other    • Bipolar disorder Other    • Breast cancer Other    • Cancer Other    • Diabetes Other    • Heart disease Other    • Stroke Other    • Stomach cancer Other    • Other Other         respiratory disorder   • No Known Problems Daughter    • No Known Problems Sister    • No Known Problems  Brother    • No Known Problems Half-Brother    • No Known Problems Half-Brother    • No Known Problems Sister    • No Known Problems Half-Sister    • No Known Problems Half-Sister    • Colon polyps Neg Hx    • Colon cancer Neg Hx       Social History:  reports that she has been smoking cigarettes. She has been smoking about 0.00 packs per day. She has never used smokeless tobacco.   reports previous alcohol use.   reports no history of drug use.        Review of Systems   Constitutional: Negative for chills, fatigue and fever.   HENT: Negative for sore throat.    Eyes: Negative for visual disturbance.   Respiratory: Negative for cough, shortness of breath and wheezing.    Cardiovascular: Negative for chest pain, palpitations and leg swelling.   Gastrointestinal: Negative for abdominal pain, diarrhea, nausea and vomiting.   Endocrine: Negative for cold intolerance and heat intolerance.   Genitourinary: Negative for dysuria, flank pain, frequency, menstrual problem, pelvic pain, vaginal bleeding, vaginal discharge and vaginal pain.   Skin: Negative for color change and pallor.   Neurological: Negative for syncope, light-headedness and headaches.   Psychiatric/Behavioral: Negative for dysphoric mood and suicidal ideas. The patient is not nervous/anxious.      Objective     There were no vitals filed for this visit.    OBGyn Exam  Constitutional: Appears to be in no acute distress; Eyes: sclera normal; Endocrine system: thyroid palpate is normal; Pulmonary system: lungs clear; Cardiovascular system: heart regular rate and rhythm; Gastrointestinal system: abdomen soft nontender, active bowel sounds; Urologic system: CVA negative; Psychiatric: appropriate insight; Neurologic: gait within normal limits cervix: 8/75/-2, category 1 fetal heart rate tracing, cephalic by sutures    Reviewed anatomy scan which was overall reassuring with normal anatomy.      Last Labs  Lab Results   Component Value Date    WBC 8.09 07/19/2021     RBC 3.88 2021    HGB 12.4 2021    HCT 35.4 2021    MCV 91.2 2021    MCH 32.0 2021    MCHC 35.0 2021    RDW 12.2 (L) 2021    RDWSD 40.6 2021    MPV 10.1 2021     2021        Lab Results   Component Value Date    GLUCOSE 78 05/15/2015    BUN 5 (L) 05/15/2015    CREATININE 0.6 05/15/2015     05/15/2015    K 3.5 05/15/2015     05/15/2015    CO2 23 05/15/2015    CALCIUM 8.4 (L) 05/15/2015    PROTEINTOT 6.5 05/15/2015    ALBUMIN 3.7 05/15/2015    ALT 24 05/15/2015    AST 26 05/15/2015    ALKPHOS 77 05/15/2015    BILITOT 1.0 05/15/2015    ANIONGAP 10.0 05/15/2015       No results found for: HCGQUAL      Assessment/Plan:  1. 21 y.o. [id number].  Active labor.  Reassuring maternal status, reassuring fetal status.  Anticipate routine labor care, anticipate vaginal delivery.      Marcie Roberts and I have discussed pain goals for this hospitalization after reviewing her current clinical condition, medical history and prior pain experiences.  The goal is to keep her pain level tolerable.  To help achieve this, I plan to offer IV pain medication and epidural if able..           This document has been electronically signed by Naseem Merritt DO on October 10, 2021 22:00 CDT

## 2021-10-11 NOTE — PLAN OF CARE
Goal Outcome Evaluation:  Plan of Care Reviewed With: patient, significant other        Progress: improving  Outcome Summary: VSS, FF, lochia light, voids and ambulated to the bathroom. pain controlled with meds. breast and bottle

## 2021-10-11 NOTE — PLAN OF CARE
Goal Outcome Evaluation:           Progress: improving  Outcome Summary: VSS, FF, lochia light, voiding, ambulating and pain controlled. Breast but will supplement if needed. Precautions discontinued.

## 2021-10-12 VITALS
HEART RATE: 90 BPM | TEMPERATURE: 97.4 F | DIASTOLIC BLOOD PRESSURE: 88 MMHG | RESPIRATION RATE: 18 BRPM | SYSTOLIC BLOOD PRESSURE: 131 MMHG | OXYGEN SATURATION: 98 %

## 2021-10-12 PROCEDURE — 0503F POSTPARTUM CARE VISIT: CPT | Performed by: OBSTETRICS & GYNECOLOGY

## 2021-10-12 RX ORDER — MEDROXYPROGESTERONE ACETATE 150 MG/ML
150 INJECTION, SUSPENSION INTRAMUSCULAR ONCE
Status: COMPLETED | OUTPATIENT
Start: 2021-10-12 | End: 2021-10-12

## 2021-10-12 RX ORDER — IBUPROFEN 800 MG/1
800 TABLET ORAL EVERY 8 HOURS PRN
Qty: 30 TABLET | Refills: 1 | Status: SHIPPED | OUTPATIENT
Start: 2021-10-12 | End: 2021-10-26

## 2021-10-12 RX ORDER — ACETAMINOPHEN 500 MG
1000 TABLET ORAL EVERY 6 HOURS PRN
Qty: 30 TABLET | Refills: 1 | Status: SHIPPED | OUTPATIENT
Start: 2021-10-12 | End: 2021-10-26

## 2021-10-12 RX ADMIN — MEDROXYPROGESTERONE ACETATE 150 MG: 150 INJECTION, SUSPENSION INTRAMUSCULAR at 09:46

## 2021-10-12 RX ADMIN — PRENATAL VIT W/ FE FUMARATE-FA TAB 27-0.8 MG 1 TABLET: 27-0.8 TAB at 08:02

## 2021-10-12 RX ADMIN — HYDROCORTISONE 2.5% 1 APPLICATION: 25 CREAM TOPICAL at 08:45

## 2021-10-12 RX ADMIN — IBUPROFEN 800 MG: 800 TABLET, FILM COATED ORAL at 08:02

## 2021-10-12 RX ADMIN — DOCUSATE SODIUM 100 MG: 100 CAPSULE, LIQUID FILLED ORAL at 08:02

## 2021-10-12 NOTE — PLAN OF CARE
Goal Outcome Evaluation:  Plan of Care Reviewed With: patient        Progress: improving  Outcome Summary: VSS, FF, bleeding light, ambulating. denises pain.

## 2021-10-12 NOTE — PLAN OF CARE
Problem: Adult Inpatient Plan of Care  Goal: Plan of Care Review  Outcome: Met  Flowsheets  Taken 10/12/2021 1151 by Oneyda Rodney RN  Progress: improving  Outcome Summary: VSS, fundus firm, bleeding light, ambulating in room, pain controlled with po meds.  pt given instructions on how to use sitz bath, cream and witch hazel pads for hemorroids. depo shot given.   pt discharging home today.  Taken 10/12/2021 0336 by Linda Austin RN  Plan of Care Reviewed With: patient   Goal Outcome Evaluation:           Progress: improving  Outcome Summary: VSS, fundus firm, bleeding light, ambulating in room, pain controlled with po meds.  pt given instructions on how to use sitz bath, cream and witch hazel pads for hemorroids. depo shot given.   pt discharging home today.

## 2021-10-12 NOTE — PROGRESS NOTES
[facility]  Progress Note - Obstetrics    Name: Marcie Roberts  MRN: [id number]  Location: M759/1  Date: 10/12/2021  CSN: [id number]       PPD #2 s/p  at 40w3d with term labor     Patient seen and examined.  Denies headache, dizziness, chest pain, shortness of breath, nausea, vomiting.  Minimal/moderate lochia.  OOB and ambulating.  Tolerating regular diet.  Voiding without difficulty.  Breastfeeding.    PHYSICAL EXAM  /64 (BP Location: Right arm, Patient Position: Lying)   Pulse 78   Temp 97.7 °F (36.5 °C) (Axillary)   Resp 18   LMP 2020 (Exact Date)   SpO2 99%   Breastfeeding Yes   General: AAOx3, no apparent distress.  Lungs: CTA B/L anteriorly, no wheezes, rales, rhonchi.  CV: Acceptable rate, regular rhythm, S1/S2 normal.  Abdomen: +BS, soft, nondistended, uterine fundus firm, nontender, and below umbilicus.  Lower extremities: No bilateral edema.  2+/4+ dorsalis pedis pulses B/L.    Intake/Output Summary (Last 24 hours) at 10/12/2021 0512  Last data filed at 10/11/2021 1000  Gross per 24 hour   Intake 360 ml   Output --   Net 360 ml     IMPRESSION  Marcie Roberts is a 21 y.o.  PPD #2 s/p .  Doing well and recovering appropriately.    PLAN  1.  Postpartum s/p   - Continue routine postpartum care.  - Diet: Pregnancy/breastfeeding  - DVT prophylaxis: SCDs  - Breastfeeding  - Birth control options were discussed and patient decided on Depo -> Nexplanon  - Discharge to home today.  Follow-up in 2 weeks, 6 weeks with OB/GYN provider.     This document has been electronically signed by Mae Hernandez MD on 2021 05:12 CDT.

## 2021-10-12 NOTE — DISCHARGE SUMMARY
[facility]  Discharge Summary  Patient Name: Marcie Roberts  : 2000  MRN: [id number]  CSN: [id number]    Discharge Summary    Date of Admission: 10/10/2021   Date of Discharge: 10/12/2021    Principle Discharge Dx: Active Hospital Problems    Diagnosis  POA   • **Normal labor [O80, Z37.9]  Not Applicable   •  (normal spontaneous vaginal delivery) [O80]  Not Applicable   • Rh negative status during pregnancy in third trimester [O26.893, Z67.91]  Yes     RhoGAM      • Supervision of high risk pregnancy in third trimester [O09.93]  Not Applicable   • H. pylori infection [A04.8]  Yes     Carafate, Nexium        Procedures Performed:    Brief History: Patient is a 21 y.o. now  who presented to labor and delivery at 40w3d in term labor.   Hospital Course: Patient presented at 40w3d in term labor.  She had a .  Her postpartum course was unremarkable.  On PPD #2 she expressed the desire for discharge.  She had passed gas and was urinating normally.  She was eating a regular diet without difficulty.  She was ambulating well.  Discharge instructions were given.  All questions were answered   Condition:  Discharge Activity:  Discharge Diet: Stable  Activity Instructions     Bathing Restrictions      Type of Restriction: Bathing    Bathing Restrictions: Other    Explain Bathing Restrictions: No soaking in bathtub for 4 weeks. Showers are fine.    Driving Restrictions      Type of Restriction: Driving    Driving Restrictions: No Driving (Time Limited)    Length: Other    Indicate Length of Restriction: No driving for 1 week or if using narcotic pain medications. Riding is car is fine.    Lifting Restrictions      Type of Restriction: Lifting    Lifting Restrictions: Other    Explain Lifing Restrictions: No lifting more than infant and baby carrier together for 6 weeks.    Pelvic Rest      Nothing in the vagina for 6 weeks to include tampons, intercourse, or douching.     Sexual Activity Restrictions      Type of Restriction: Sex    Explain Sexual Activity Restrictions: No sexual intercourse for at least 6 weeks        Regular   Discharge Medications:    Your medication list      START taking these medications      Instructions Last Dose Given Next Dose Due   acetaminophen 500 MG tablet  Commonly known as: TYLENOL      Take 2 tablets by mouth Every 6 (Six) Hours As Needed for Mild Pain .       ibuprofen 800 MG tablet  Commonly known as: ADVIL,MOTRIN      Take 1 tablet by mouth Every 8 (Eight) Hours As Needed for Moderate Pain .          CONTINUE taking these medications      Instructions Last Dose Given Next Dose Due   doxylamine 25 MG tablet  Commonly known as: UNISOM      Take 1 tablet by mouth every night at bedtime.       esomeprazole 40 MG capsule  Commonly known as: nexIUM      Take 1 capsule by mouth Daily.       fluticasone 50 MCG/ACT nasal spray  Commonly known as: FLONASE           loratadine 10 MG tablet  Commonly known as: CLARITIN           magnesium oxide 400 (241.3 Mg) MG tablet tablet  Commonly known as: MAGOX      Take 1 tablet by mouth Daily.       MUCINEX NASAL SPRAY MOISTURE NA      into the nostril(s) as directed by provider.       nystatin [id number] UNIT/GM powder  Commonly known as: MYCOSTATIN           omeprazole 20 MG capsule  Commonly known as: priLOSEC      Take 1 capsule by mouth Daily.       prenatal vitamin 27-0.8 27-0.8 MG tablet tablet      Take  by mouth Daily.       vitamin B-6 100 MG tablet  Commonly known as: PYRIDOXINE      Take 1 tablet by mouth every night at bedtime.             Where to Get Your Medications      These medications were sent to [facility]/pharmacy #[id number] - [place], [place] - [address] [place] [address] [phone] [facility] [phone] [address] [address]    Phone: [phone] ·   acetaminophen 500 MG tablet  · ibuprofen 800 MG tablet        Discharge Disposition: Home   Follow-up: 2 week PP visit w/ Nexplanon insertion  6  week PP visit     <30 minutes was spent with the patient on the day of discharge.    This document has been electronically signed by Mae Hernandez MD on October 12, 2021 05:13 CDT.

## 2021-10-12 NOTE — DISCHARGE INSTR - OTHER ORDERS
"Notify Dr of... heavy bleeding, passing clots, foul odor to your discharge, temperature above 100.4, burning when urinating, gapping or drainage from incision or episiotomy, or for pain not relieved by taking pain medication, redness or streaking in breasts, pain or redness in legs.    Take all medications as prescribed.  Continue taking iron or prenatal vitamin while breastfeeding or until you run out.  Take rest periods several times during the day.  \"Baby Blues\" are normal and may be present around the 3rd-4th day after delivery. If they last longer than 2-3 days, please let your Dr know.  Pelvic rest for 6 weeks. No douching, tampons, or intercourse  No driving for 2 weeks  No lifting anything heavier than the baby  Wear a good supportive bra 24 hours/day to prevent engorgement   "

## 2021-10-13 ENCOUNTER — TELEPHONE (OUTPATIENT)
Dept: OBSTETRICS AND GYNECOLOGY | Facility: CLINIC | Age: 21
End: 2021-10-13

## 2021-10-13 NOTE — TELEPHONE ENCOUNTER
Pt called with complaints headaches, dizziness, and painful knots in breasts. She has not taken anything for headache. Told pt to take tylenol or ibuprofen and ensure she is drinking plenty of water.  Massage knots in breasts and continue pumping.  She needs to be seen if symptoms worsen or fail to improve.

## 2021-10-25 ENCOUNTER — TELEPHONE (OUTPATIENT)
Dept: LACTATION | Facility: HOSPITAL | Age: 21
End: 2021-10-25

## 2021-10-26 ENCOUNTER — POSTPARTUM VISIT (OUTPATIENT)
Dept: OBSTETRICS AND GYNECOLOGY | Facility: CLINIC | Age: 21
End: 2021-10-26

## 2021-10-26 VITALS — DIASTOLIC BLOOD PRESSURE: 64 MMHG | BODY MASS INDEX: 28.44 KG/M2 | WEIGHT: 140.8 LBS | SYSTOLIC BLOOD PRESSURE: 110 MMHG

## 2021-10-26 DIAGNOSIS — Z30.017 INSERTION OF NEXPLANON: ICD-10-CM

## 2021-10-26 PROBLEM — Z37.9 NORMAL LABOR: Status: RESOLVED | Noted: 2021-10-10 | Resolved: 2021-10-26

## 2021-10-26 LAB
B-HCG UR QL: NEGATIVE
EXPIRATION DATE: NORMAL
INTERNAL NEGATIVE CONTROL: NEGATIVE
INTERNAL POSITIVE CONTROL: POSITIVE
Lab: NORMAL

## 2021-10-26 PROCEDURE — 81025 URINE PREGNANCY TEST: CPT | Performed by: OBSTETRICS & GYNECOLOGY

## 2021-10-26 PROCEDURE — 11981 INSERTION DRUG DLVR IMPLANT: CPT | Performed by: OBSTETRICS & GYNECOLOGY

## 2021-10-26 PROCEDURE — 0503F POSTPARTUM CARE VISIT: CPT | Performed by: OBSTETRICS & GYNECOLOGY

## 2021-10-26 NOTE — PROGRESS NOTES
[facility]  Gynecology  Procedure Note: Nexplanon insertion    Patient's last menstrual period was 12/31/2020 (exact date).    Date of procedure:  10/26/2021    Risks and benefits discussed? yes  All questions answered? yes  Consents given by the patient  Written consent obtained? yes    Local anesthesia used:  yes - 1 cc's of  Meds; anesthesia local: 1% lidocaine with epinephrine    Procedure documentation:    The upper left arm (non-dominant) was marked at the intended site of insertion. The skin was cleansed with an antiseptic solution.  Local anesthesia was injected.  The Nexplanon was placed subdermally without difficulty.  The devise was able to be palpated in the arm by both myself and Marcie.  The site was cleansed then a 4x4 clean gauze was place over the site of insertion and wrapped with gauze.     She tolerated the procedure well.  There were no complications.  EBL was minimal.    Nexplanon information  NDC: 49005-611-93  Lot #: [id number]  Expiration date: 3/30/2024    Device supplied by the clinic.    Post procedure instructions: Remove the wrapping in 24 hours and cover with a Band-Aid if still open.    Follow up needed: CLINT Hernandez MD  10/26/2021  15:01 CDT

## 2021-10-26 NOTE — PROGRESS NOTES
Postpartum visit      Marcie Roberts is a 21 y.o.  s/p Vaginal, Spontaneous on 10/10/2021 at 40w3d secondary to term labor who presents today for postpartum check.  The patient states she is doing well.  Patient denies postpartum depression.  Menstrual cycles have not resumed.  Breastfeeding, but has concerns.  Desires Nexplanon for contraception.  She has not resumed sexual intercourse.  Denies bowel or bladder issues.    PHYSICAL EXAM:    /64 (BP Location: Left arm, Patient Position: Sitting, Cuff Size: Adult)   Wt 63.9 kg (140 lb 12.8 oz)   LMP 2020 (Exact Date)   BMI 28.44 kg/m²   Abdomen: +BS, benign, no masses, soft, non-tender.  Extremities: No deep calf tenderness.  Postpartum Depression Screening Questionnaire: 9    IMPRESSION/PLAN: Marcie Roberts is a 21 y.o.  s/p Vaginal, Spontaneous on 10/10/2021.  Doing well.  - Recovered nicely from her delivery  - Contraception: Nexplanon; see procedure note  - RTC 4 weeks for final PP visit  - Continue pelvic rest x4 weeks  - Information given regarding lactation consultant    This document has been electronically signed by Mae Hernandez MD on 2021 15:02 CDT.

## 2021-11-23 DIAGNOSIS — Z12.4 SCREENING FOR CERVICAL CANCER: Primary | ICD-10-CM

## 2021-12-07 ENCOUNTER — POSTPARTUM VISIT (OUTPATIENT)
Dept: OBSTETRICS AND GYNECOLOGY | Facility: CLINIC | Age: 21
End: 2021-12-07

## 2021-12-07 VITALS
BODY MASS INDEX: 26.81 KG/M2 | SYSTOLIC BLOOD PRESSURE: 94 MMHG | WEIGHT: 142 LBS | DIASTOLIC BLOOD PRESSURE: 62 MMHG | HEIGHT: 61 IN

## 2021-12-07 PROCEDURE — 0503F POSTPARTUM CARE VISIT: CPT | Performed by: FAMILY MEDICINE

## 2021-12-07 NOTE — PROGRESS NOTES
"6 week postpartum visit      Marcie Roberts is a 21 y.o.  s/p  on 10/10/21, who presents today for a 6 week postpartum check.  The patient states she & baby are doing well.  Patient denies postpartum depression.  Menstrual cycles have resumed.  Breast feeding.  She had Nexplanon inserted at 2wk pp visit for contraception.  She has not resumed sexual intercourse.  Denies bowel or bladder issues.    PHYSICAL EXAM:    BP 94/62   Ht 154.9 cm (61\")   Wt 64.4 kg (142 lb)   BMI 26.83 kg/m²   GEN: A&Ox3, NAD  Resp: normal effort  Abdomen: +BS, benign, no masses, soft, non-tender.  Bimanual exam: pt declined  Extremities: No deep calf tenderness.  Postpartum Depression Screening Questionnaire: 9, no treatment indicated.    IMPRESSION/PLAN:  21 y.o.  s/p term , 6 weeks postpartum.  Doing well.     Diagnosis Plan   1. Encounter for postpartum visit         - Recovered nicely from her delivery  - Return to normal physical activity  - Contraception: Nexplanon  - Resume annual gynecological examinations  - last pap 2020 - [facility] at [facility]        This document has been electronically signed by Lindsey Salmon MD on 2021 13:51 CST      "

## 2022-03-22 NOTE — PROGRESS NOTES
AUDIOMETRIC EVALUATION      Name:  Marcie Roberts  :  2000  Age:  21 y.o.  Date of Evaluation:  2022       History:  Reason for visit:  Ms. Roberts is seen today at the request of ZO Mane for a hearing evaluation. Patient was referred to ENT clinic for bilateral chronic serous otitis media. Patient reports for the past month, both ears feel full. Recently, both ears have started popping. Her ears also hurt with cold air. Patient sometimes has trouble hearing in both ears, but not always. Patient has not had her hearing tested recently.     PE Tubes:  yes, both ears, age 8  Other otologic surgical history: no, both ears  Tinnitus:  yes, both ears  Dizziness:  no  Noise Exposure: yes, guns (right-handed) no hearing protection  Aural Fullness:  yes, both ears  Otalgia: yes, both ears  Family history of hearing loss: no  Other significant history: none      EVALUATION:         RESULTS:    Otoscopic Evaluation:  Bilateral: clear canal, tympanic membrane visualized     Tympanometry (226 Hz):  Bilateral: Type C- negative pressure    Pure Tone Audiometry:    Bilateral: mild relatively flat sensorineural hearing loss     Speech Audiometry:   Bilateral: Speech Reception Threshold (SRT) was obtained at 20 dBHL  Word Recognition scores- excellent or within normal limits (90 - 100%) using NU-6 List 4A, 25 words    IMPRESSIONS:  Tympanometry showed significant negative middle ear pressure in the presence of normal static compliance, consistent with Eustachian tube dysfunction or middle ear pathology, for both ears. Pure tone thresholds for both ears show a mild relatively flat sensorineural hearing loss, suggesting normal outer/middle ear function and abnormal cochlear/retrocochlear function. Patient was counseled with regard to the findings.    Diagnosis:  1. Sensorineural hearing loss (SNHL) of both ears    2. Dysfunction of both eustachian tubes    3. Tinnitus of both ears    4. Otalgia of both  ears         RECOMMENDATIONS/PLAN:  Follow-up recommendations per ZO Mane    Return for audiologic testing if noticing changes in hearing or concerns arise  Use communication strategies  Use hearing protection around loud noises  Avoid silence when possible. Sleep with white noise/fan, or listen to nature sounds      EDUCATION:  Discussed results and recommendations with patient. Questions were addressed and the patient was encouraged to contact our department should concerns arise.        RICHELLE Masters  Licensed Audiologist

## 2022-03-23 ENCOUNTER — PROCEDURE VISIT (OUTPATIENT)
Dept: OTOLARYNGOLOGY | Facility: CLINIC | Age: 22
End: 2022-03-23

## 2022-03-23 ENCOUNTER — OFFICE VISIT (OUTPATIENT)
Dept: OTOLARYNGOLOGY | Facility: CLINIC | Age: 22
End: 2022-03-23

## 2022-03-23 VITALS — HEART RATE: 85 BPM | TEMPERATURE: 97.4 F | DIASTOLIC BLOOD PRESSURE: 73 MMHG | SYSTOLIC BLOOD PRESSURE: 117 MMHG

## 2022-03-23 DIAGNOSIS — M26.609 TMJ (TEMPOROMANDIBULAR JOINT DISORDER): ICD-10-CM

## 2022-03-23 DIAGNOSIS — H93.13 TINNITUS OF BOTH EARS: ICD-10-CM

## 2022-03-23 DIAGNOSIS — H92.03 OTALGIA OF BOTH EARS: ICD-10-CM

## 2022-03-23 DIAGNOSIS — J30.9 ALLERGIC RHINITIS, UNSPECIFIED SEASONALITY, UNSPECIFIED TRIGGER: ICD-10-CM

## 2022-03-23 DIAGNOSIS — H90.3 SENSORINEURAL HEARING LOSS (SNHL) OF BOTH EARS: ICD-10-CM

## 2022-03-23 DIAGNOSIS — H69.83 DYSFUNCTION OF BOTH EUSTACHIAN TUBES: ICD-10-CM

## 2022-03-23 DIAGNOSIS — H69.83 DYSFUNCTION OF BOTH EUSTACHIAN TUBES: Primary | ICD-10-CM

## 2022-03-23 DIAGNOSIS — H90.3 SENSORINEURAL HEARING LOSS (SNHL) OF BOTH EARS: Primary | ICD-10-CM

## 2022-03-23 PROCEDURE — 92557 COMPREHENSIVE HEARING TEST: CPT

## 2022-03-23 PROCEDURE — 99214 OFFICE O/P EST MOD 30 MIN: CPT | Performed by: NURSE PRACTITIONER

## 2022-03-23 PROCEDURE — 92567 TYMPANOMETRY: CPT

## 2022-03-23 RX ORDER — METHYLPREDNISOLONE 4 MG/1
TABLET ORAL
Qty: 1 EACH | Refills: 0 | Status: SHIPPED | OUTPATIENT
Start: 2022-03-23 | End: 2022-09-08

## 2022-03-23 RX ORDER — FLUTICASONE PROPIONATE 50 MCG
2 SPRAY, SUSPENSION (ML) NASAL DAILY
Qty: 16 G | Refills: 6 | Status: SHIPPED | OUTPATIENT
Start: 2022-03-23 | End: 2023-01-18

## 2022-03-23 RX ORDER — CETIRIZINE HYDROCHLORIDE 10 MG/1
TABLET ORAL
COMMUNITY
Start: 2022-02-23 | End: 2022-09-08

## 2022-03-23 NOTE — PROGRESS NOTES
[facility]  W ENT [facility] EAR NOSE & THROAT  [address], [address]  [facility] [place] [facility] [address]  Fax [phone]  Phone [phone]      Visit Type: NEW PATIENT   Chief Complaint   Patient presents with   • Earache        HPI  Marcie Roberts is a 21 y.o. female who presents for evaluation of otalgia, ear pressure, decreased hearing.  Her symptoms are localized to the right> left ears.  Her symptoms have been present for the last month.  She has been diagnosed with fluid on her ears and an ear infection by her PCP.  She has been treated with Zyrtec, Claritin, and antibiotics without improvement in symptoms.  She has also had intermittent bilateral tinnitus.  She denies otorrhea, dizziness, or vertigo.  She reports a history of myringotomy tubes at the age of 8.    Past Medical History:   Diagnosis Date   • ADHD    • H. pylori infection        Past Surgical History:   Procedure Laterality Date   • APPENDECTOMY     • CHOLECYSTECTOMY     • EAR TUBES     • ENDOSCOPY  04/07/2017    Focal active chronic gastritis, Few Helicobacter organisms Dr. Moses   • LAPAROSCOPIC CHOLECYSTECTOMY     • TONSILLECTOMY AND ADENOIDECTOMY     • WISDOM TOOTH EXTRACTION         Family History: Her family history includes ADD / ADHD in an other family member; Bipolar disorder in an other family member; Breast cancer in an other family member; Cancer in an other family member; Diabetes in an other family member; Heart disease in an other family member; No Known Problems in her brother, daughter, half-brother, half-brother, half-sister, half-sister, sister, and sister; Other in an other family member; Stomach cancer in an other family member; Stroke in an other family member.     Social History: She  reports that she has been smoking cigarettes. She has been smoking about 0.00 packs per day. She has never used smokeless tobacco. She reports previous alcohol use. She reports  that she does not use drugs.    Home Medications:  Etonogestrel, cetirizine, fluticasone, loratadine, methylPREDNISolone, and sertraline    Allergies:  She has No Known Allergies.       Vital Signs:   Temp:  [97.4 °F (36.3 °C)] 97.4 °F (36.3 °C)  Heart Rate:  [85] 85  BP: (117)/(73) 117/73  ENT Physical Exam  Constitutional  Appearance: patient appears well-developed,  Communication/Voice: communication appropriate for developmental age;  Head and Face  Appearance: head appears normal and face appears atraumatic;  Palpation: TMJ crepitus (Right> left TMJ tenderness to palpation) noted;  Ear  Auricles: bilateral auricles normal;  Ear Canals: bilateral ear canals normal;  Tympanic Membranes: right tympanic membrane abnormal and retracted; with myringosclerosis; left tympanic membrane abnormal; dull; with myringosclerosis;  Nose  External Nose: nares patent bilaterally;  Internal Nose: bilateral intranasal mucosa edematous and erythematous;  Oral Cavity/Oropharynx  Lips: normal;  Neck  Neck: neck normal;  Respiratory  Inspection: breathing unlabored;  Neurovestibular  Mental Status: alert and oriented;  Psychiatric: mood normal; affect is appropriate;         Result Review    RESULTS REVIEW    I have reviewed the patients old records in the chart.  EVALUATION:           RESULTS:     Otoscopic Evaluation:  Bilateral: clear canal, tympanic membrane visualized      Tympanometry (226 Hz):  Bilateral: Type C- negative pressure     Pure Tone Audiometry:    Bilateral: mild relatively flat sensorineural hearing loss      Speech Audiometry:   Bilateral: Speech Reception Threshold (SRT) was obtained at 20 dBHL  Word Recognition scores- excellent or within normal limits (90 - 100%) using NU-6 List 4A, 25 words     IMPRESSIONS:  Tympanometry showed significant negative middle ear pressure in the presence of normal static compliance, consistent with Eustachian tube dysfunction or middle ear pathology, for both ears. Pure tone  thresholds for both ears show a mild relatively flat sensorineural hearing loss, suggesting normal outer/middle ear function and abnormal cochlear/retrocochlear function. Patient was counseled with regard to the findings.     Diagnosis:  1. Sensorineural hearing loss (SNHL) of both ears    2. Dysfunction of both eustachian tubes    3. Tinnitus of both ears    4. Otalgia of both ears          RECOMMENDATIONS/PLAN:  Follow-up recommendations per ZO Mane    Return for audiologic testing if noticing changes in hearing or concerns arise  Use communication strategies  Use hearing protection around loud noises  Avoid silence when possible. Sleep with white noise/fan, or listen to nature sounds       EDUCATION:  Discussed results and recommendations with patient. Questions were addressed and the patient was encouraged to contact our department should concerns arise.           RICHELLE Masters  Licensed Audiologist        Assessment/Plan    Diagnoses and all orders for this visit:    1. Dysfunction of both eustachian tubes (Primary)  -     Comprehensive Hearing Test; Future    2. Otalgia of both ears  -     Comprehensive Hearing Test; Future    3. Sensorineural hearing loss (SNHL) of both ears    4. Tinnitus of both ears    5. TMJ (temporomandibular joint disorder)    6. Allergic rhinitis, unspecified seasonality, unspecified trigger    Other orders  -     fluticasone (FLONASE) 50 MCG/ACT nasal spray; 2 sprays into the nostril(s) as directed by provider Daily for 30 days.  Dispense: 16 g; Refill: 6  -     methylPREDNISolone (Medrol) 4 MG dose pack; Take as directed on package instructions.  Dispense: 1 each; Refill: 0       Start Flonase.  Continue Zyrtec.  We will try to avoid using Astelin while breast-feeding.  TMJ precautions discussed.  We will try oral steroids.  Use hearing protection in loud noise situations.  Tinnitus masking and precautions discussed.  Obtain a yearly audiogram to follow hearing.  If  no improvement in symptoms on follow-up, consider myringotomy tube insertion.  She is instructed to call return should any problems arise prior to next office visit.  Follow-up in 6 weeks    Return in about 6 weeks (around 5/4/2022), or if symptoms worsen or fail to improve, for Recheck.      Violeta Coronado, APRN  03/23/22  13:46 CDT

## 2022-04-02 DIAGNOSIS — O21.9 NAUSEA AND VOMITING DURING PREGNANCY PRIOR TO 22 WEEKS GESTATION: ICD-10-CM

## 2022-04-04 RX ORDER — DIAPER,BRIEF,ADULT, DISPOSABLE
EACH MISCELLANEOUS
Qty: 30 TABLET | Refills: 2 | OUTPATIENT
Start: 2022-04-04

## 2022-05-04 ENCOUNTER — OFFICE VISIT (OUTPATIENT)
Dept: OTOLARYNGOLOGY | Facility: CLINIC | Age: 22
End: 2022-05-04

## 2022-05-04 VITALS — SYSTOLIC BLOOD PRESSURE: 140 MMHG | TEMPERATURE: 97.2 F | HEART RATE: 77 BPM | DIASTOLIC BLOOD PRESSURE: 73 MMHG

## 2022-05-04 DIAGNOSIS — M26.609 TMJ (TEMPOROMANDIBULAR JOINT DISORDER): ICD-10-CM

## 2022-05-04 DIAGNOSIS — H69.83 DYSFUNCTION OF BOTH EUSTACHIAN TUBES: Primary | ICD-10-CM

## 2022-05-04 DIAGNOSIS — H92.03 OTALGIA OF BOTH EARS: ICD-10-CM

## 2022-05-04 DIAGNOSIS — J30.9 ALLERGIC RHINITIS, UNSPECIFIED SEASONALITY, UNSPECIFIED TRIGGER: ICD-10-CM

## 2022-05-04 DIAGNOSIS — H93.13 TINNITUS OF BOTH EARS: ICD-10-CM

## 2022-05-04 DIAGNOSIS — H90.3 SENSORINEURAL HEARING LOSS (SNHL) OF BOTH EARS: ICD-10-CM

## 2022-05-04 PROCEDURE — 92567 TYMPANOMETRY: CPT | Performed by: NURSE PRACTITIONER

## 2022-05-04 PROCEDURE — 99214 OFFICE O/P EST MOD 30 MIN: CPT | Performed by: NURSE PRACTITIONER

## 2022-05-04 NOTE — PROGRESS NOTES
ZO Carson  FER ENT [facility] EAR NOSE & THROAT  [address], [address]  [facility] [address]  Fax [phone]  Phone [phone]      Visit Type: FOLLOW UP   Chief Complaint   Patient presents with   • Earache        HPI  Marcie Roberts is a 22 y.o. female who presents for follow-up evaluation of otalgia, ear pressure, decreased hearing.  Her symptoms are localized to the right> left ears.  Her symptoms have been present for the last few months.  She has previously been diagnosed with fluid on her ears and an ear infection by her PCP.  She has been treated with Zyrtec, Claritin, and antibiotics without improvement in symptoms.  She has also tried oral steroids and Flonase nasal spray without improvement in symptoms.  Her symptoms seem to be mildly improved with ibuprofen.  She has also had intermittent bilateral tinnitus.  She denies otorrhea, dizziness, or vertigo.  She reports a history of myringotomy tubes at the age of 8.    She has only been able to wear a mouthguard for 2 days.    Past Medical History:   Diagnosis Date   • ADHD    • H. pylori infection        Past Surgical History:   Procedure Laterality Date   • APPENDECTOMY     • CHOLECYSTECTOMY     • EAR TUBES     • ENDOSCOPY  04/07/2017    Focal active chronic gastritis, Few Helicobacter organisms Dr. Moses   • LAPAROSCOPIC CHOLECYSTECTOMY     • TONSILLECTOMY AND ADENOIDECTOMY     • WISDOM TOOTH EXTRACTION         Family History: Her family history includes ADD / ADHD in an other family member; Bipolar disorder in an other family member; Breast cancer in an other family member; Cancer in an other family member; Diabetes in an other family member; Heart disease in an other family member; No Known Problems in her brother, daughter, half-brother, half-brother, half-sister, half-sister, sister, and sister; Other in an other family member; Stomach cancer in an other family member; Stroke  in an other family member.     Social History: She  reports that she has been smoking cigarettes. She has been smoking about 0.00 packs per day. She has never used smokeless tobacco. She reports previous alcohol use. She reports that she does not use drugs.    Home Medications:  Etonogestrel, cetirizine, fluticasone, loratadine, methylPREDNISolone, and sertraline    Allergies:  She has No Known Allergies.       Vital Signs:     /73   Pulse 77   Temp 97.2 °F (36.2 °C) (Temporal)     ENT Physical Exam  Constitutional  Appearance: patient appears well-developed,  Communication/Voice: communication appropriate for developmental age;  Head and Face  Appearance: head appears normal and face appears atraumatic;  Palpation: TMJ crepitus (Right> left TMJ tenderness to palpation) noted;  Ear  Auricles: bilateral auricles normal;  Ear Canals: bilateral ear canals normal;  Ear comments: Bilateral tympanic membranes are intact with myringosclerosis.  Type A tympanogram bilaterally  Nose  External Nose: nares patent bilaterally;  Internal Nose: bilateral intranasal mucosa edematous and erythematous;  Oral Cavity/Oropharynx  Lips: normal;  Neck  Neck: neck normal;  Respiratory  Inspection: breathing unlabored;  Neurovestibular  Mental Status: alert and oriented;  Psychiatric: mood normal; affect is appropriate;       I performed tympanograms on the bilateral ears to measure the middle ear pressure. The results were: Type A tympanograms bilaterally. H69.83    Result Review    RESULTS REVIEW    I have reviewed the patients old records in the chart.  EVALUATION:           RESULTS:     Otoscopic Evaluation:  Bilateral: clear canal, tympanic membrane visualized      Tympanometry (226 Hz):  Bilateral: Type C- negative pressure     Pure Tone Audiometry:    Bilateral: mild relatively flat sensorineural hearing loss      Speech Audiometry:   Bilateral: Speech Reception Threshold (SRT) was obtained at 20 dBHL  Word Recognition  scores- excellent or within normal limits (90 - 100%) using NU-6 List 4A, 25 words     IMPRESSIONS:  Tympanometry showed significant negative middle ear pressure in the presence of normal static compliance, consistent with Eustachian tube dysfunction or middle ear pathology, for both ears. Pure tone thresholds for both ears show a mild relatively flat sensorineural hearing loss, suggesting normal outer/middle ear function and abnormal cochlear/retrocochlear function. Patient was counseled with regard to the findings.     Diagnosis:  1. Sensorineural hearing loss (SNHL) of both ears    2. Dysfunction of both eustachian tubes    3. Tinnitus of both ears    4. Otalgia of both ears          RECOMMENDATIONS/PLAN:  Follow-up recommendations per ZO Mane    Return for audiologic testing if noticing changes in hearing or concerns arise  Use communication strategies  Use hearing protection around loud noises  Avoid silence when possible. Sleep with white noise/fan, or listen to nature sounds       EDUCATION:  Discussed results and recommendations with patient. Questions were addressed and the patient was encouraged to contact our department should concerns arise.           RICHELLE Masters  Licensed Audiologist        Assessment & Plan    Diagnoses and all orders for this visit:    1. Dysfunction of both eustachian tubes (Primary)    2. Otalgia of both ears    3. Sensorineural hearing loss (SNHL) of both ears    4. Tinnitus of both ears    5. TMJ (temporomandibular joint disorder)    6. Allergic rhinitis, unspecified seasonality, unspecified trigger       Continue Flonase.  Continue TMJ precautions.  Use hearing protection in loud noise situations.  Tinnitus masking and precautions discussed.  Obtain a yearly audiogram to follow hearing.  If no improvement in symptoms on follow-up, consider myringotomy versus myringotomy tube insertion to see if this improves her symptoms.  She is instructed to call return  should any problems arise prior to next office visit.  Follow-up in 6 weeks with Dr. Blancas for possible myringotomy versus myringotomy tube insertion    Return in about 6 weeks (around 6/15/2022) for Recheck, With Dr. Blancas.      Violeta Coronado, APRN

## 2022-06-22 PROBLEM — O26.893 RH NEGATIVE STATUS DURING PREGNANCY IN THIRD TRIMESTER: Status: RESOLVED | Noted: 2021-07-19 | Resolved: 2022-06-22

## 2022-06-22 PROBLEM — H69.83 EUSTACHIAN TUBE DYSFUNCTION, BILATERAL: Status: ACTIVE | Noted: 2022-06-22

## 2022-06-22 PROBLEM — Z67.91 RH NEGATIVE STATUS DURING PREGNANCY IN THIRD TRIMESTER: Status: RESOLVED | Noted: 2021-07-19 | Resolved: 2022-06-22

## 2022-06-22 PROBLEM — H69.93 EUSTACHIAN TUBE DYSFUNCTION, BILATERAL: Chronic | Status: ACTIVE | Noted: 2022-06-22

## 2022-06-22 PROBLEM — H69.93 EUSTACHIAN TUBE DYSFUNCTION, BILATERAL: Status: ACTIVE | Noted: 2022-06-22

## 2022-06-22 PROBLEM — H69.83 EUSTACHIAN TUBE DYSFUNCTION, BILATERAL: Chronic | Status: ACTIVE | Noted: 2022-06-22

## 2022-06-22 PROBLEM — O09.93 SUPERVISION OF HIGH RISK PREGNANCY IN THIRD TRIMESTER: Status: RESOLVED | Noted: 2021-03-01 | Resolved: 2022-06-22

## 2022-06-22 PROBLEM — A04.8 H. PYLORI INFECTION: Status: RESOLVED | Noted: 2021-03-01 | Resolved: 2022-06-22

## 2022-06-23 ENCOUNTER — TELEPHONE (OUTPATIENT)
Dept: OTOLARYNGOLOGY | Facility: CLINIC | Age: 22
End: 2022-06-23

## 2022-06-23 NOTE — TELEPHONE ENCOUNTER
PT MOVED APPT FROM 9AM TODAY UNTIL AUGUST 22 AT 11.15AM. PLEASE CALL PT IF NEEDS TO BE SEEN SOONER.

## 2022-09-08 ENCOUNTER — OFFICE VISIT (OUTPATIENT)
Dept: OTOLARYNGOLOGY | Facility: CLINIC | Age: 22
End: 2022-09-08

## 2022-09-08 VITALS — HEIGHT: 61 IN | TEMPERATURE: 97.4 F | BODY MASS INDEX: 27.19 KG/M2 | WEIGHT: 144 LBS

## 2022-09-08 DIAGNOSIS — H92.03 OTALGIA OF BOTH EARS: Primary | ICD-10-CM

## 2022-09-08 DIAGNOSIS — H69.83 EUSTACHIAN TUBE DYSFUNCTION, BILATERAL: ICD-10-CM

## 2022-09-08 PROCEDURE — 99213 OFFICE O/P EST LOW 20 MIN: CPT | Performed by: OTOLARYNGOLOGY

## 2022-09-08 NOTE — H&P (VIEW-ONLY)
ZO Means  FER ENT [facility] EAR NOSE & THROAT  [address], [address]  [facility] [address]  Fax [phone]  Phone [phone]      Visit Type: FOLLOW UP   Chief Complaint   Patient presents with   • Ear Problem        HPI  She presents for a follow up evaluation. She has had continued problems with otalgia, ear fullness, ear pressure and decreased hearing. The symptoms are localized to both ears. The symptoms severity was described as: mild to moderate The symptoms have been: relatively constant for the last several years There have been no identified factors that aggravate the symptoms. The patient has been treated with intranasal fluticasone in the past with unsure of results, patient reports she has not been using the nasal sprays.      Past Medical History:   Diagnosis Date   • ADHD    • H. pylori infection        Past Surgical History:   Procedure Laterality Date   • APPENDECTOMY     • CHOLECYSTECTOMY     • EAR TUBES     • ENDOSCOPY  04/07/2017    Focal active chronic gastritis, Few Helicobacter organisms Dr. Moses   • LAPAROSCOPIC CHOLECYSTECTOMY     • TONSILLECTOMY AND ADENOIDECTOMY     • WISDOM TOOTH EXTRACTION         Family History: Her family history includes ADD / ADHD in an other family member; Bipolar disorder in an other family member; Breast cancer in an other family member; Cancer in an other family member; Diabetes in an other family member; Heart disease in an other family member; No Known Problems in her brother, daughter, half-brother, half-brother, half-sister, half-sister, sister, and sister; Other in an other family member; Stomach cancer in an other family member; Stroke in an other family member.     Social History: She  reports that she has been smoking cigarettes. She has been smoking about 0.00 packs per day. She has never used smokeless tobacco. She reports previous alcohol use. She reports that she does not use  drugs.    Home Medications:  Etonogestrel, fluticasone, loratadine, and sertraline    Allergies:  She has No Known Allergies.       Vital Signs:   Temp:  [97.4 °F (36.3 °C)] 97.4 °F (36.3 °C)  ENT Physical Exam  Ear  Hearing: intact;  Auricles: right auricle normal; left auricle normal;  External Mastoids: right external mastoid normal; left external mastoid normal;  Ear Canals: right ear canal normal; left ear canal normal;  Tympanic Membranes: bilateral tympanic membranes abnormal; with myringosclerosis;     Tympanometry    Date/Time: 9/8/2022 1:07 PM  Performed by: Pauline Mcqueen APRN  Authorized by: Pauline Mcqueen APRN   Consent: Verbal consent obtained.  Consent given by: patient  Comments: Type A bilaterally  Patient complains of pain during procedure         Result Review    RESULTS REVIEW    I have reviewed the patients old records in the chart.     Assessment & Plan    Diagnoses and all orders for this visit:    1. Otalgia of both ears (Primary)    Other orders  -     Tympanometry                  No follow-ups on file.      ZO Means  09/08/22  13:08 CDT     Physician Attestation  I have seen and examined Marcie Roberts and have reviewed the notes, assessments, and/or procedures and I concur with this documentation.    Marcie Roberts is a 22 y.o. female presents for evaluation of ear problems. She has had a history of eustachian tube dysfunction with chronic bilateral ear pressure that has not been relieved with medical management..     EXTERNAL EAR CANALS: normal ear canals without stenosis or significant cerumen  TYMPANIC MEMBRANES: tympanic membrane appearance normal, no lesions, no perforation, normal mobility, no fluid      Tympanogram today shows bilateral type C tympanograms.  Previously she had type A's.        ASSESSMENT:  1. Otalgia of both ears         PLAN:  Medical and surgical options were discussed including observation versus surgical management. Risks,  benefits and alternatives were discussed and questions were answered. After considering the options, it was decided that myringotomy tube insertion was the best option.  I recommended tubes in the office but she is not able to be calm enough to perform this.  We will proceed in the operating room.  I discussed with her there is nothing ability for me to know as I am doing it with her asleep whether this helps or not.  We will proceed with tube insertion but there is a possibility of removal if it does not help her out.    INFORMED CONSENT DISCUSSION:  MYRINGOTOMY TUBE INSERTION: The risks and benefits of myringotomy tube insertion were explained including but not limited to pain, aural fullness, bleeding, infection, risks of the anesthesia, persistent tympanic membrane perforation, chronic otorrhea, early and late extrusion, and the possibility for the need of reinsertion after extrusion. Alternatives were discussed. The patient/parents demonstrated understanding of these risks. Questions were asked appropriately answered.   .      PREOPERATIVE WORKUP:   Per anesthesia    Return for follow up postoperatively.  If she improves with tubes, we will consider eustachian tube dilation in the future.        Electronically signed by Idris Blancas MD, 09/08/22, 1:39 PM CDT.

## 2022-09-08 NOTE — PROGRESS NOTES
ZO Means  FER ENT [facility] EAR NOSE & THROAT  [address]  [facility] [address]  Fax [phone]  Phone [phone]      Visit Type: FOLLOW UP   Chief Complaint   Patient presents with   • Ear Problem        HPI  She presents for a follow up evaluation. She has had continued problems with otalgia, ear fullness, ear pressure and decreased hearing. The symptoms are localized to both ears. The symptoms severity was described as: mild to moderate The symptoms have been: relatively constant for the last several years There have been no identified factors that aggravate the symptoms. The patient has been treated with intranasal fluticasone in the past with unsure of results, patient reports she has not been using the nasal sprays.      Past Medical History:   Diagnosis Date   • ADHD    • H. pylori infection        Past Surgical History:   Procedure Laterality Date   • APPENDECTOMY     • CHOLECYSTECTOMY     • EAR TUBES     • ENDOSCOPY  04/07/2017    Focal active chronic gastritis, Few Helicobacter organisms Dr. Moses   • LAPAROSCOPIC CHOLECYSTECTOMY     • TONSILLECTOMY AND ADENOIDECTOMY     • WISDOM TOOTH EXTRACTION         Family History: Her family history includes ADD / ADHD in an other family member; Bipolar disorder in an other family member; Breast cancer in an other family member; Cancer in an other family member; Diabetes in an other family member; Heart disease in an other family member; No Known Problems in her brother, daughter, half-brother, half-brother, half-sister, half-sister, sister, and sister; Other in an other family member; Stomach cancer in an other family member; Stroke in an other family member.     Social History: She  reports that she has been smoking cigarettes. She has been smoking about 0.00 packs per day. She has never used smokeless tobacco. She reports previous alcohol use. She reports that she does not use  drugs.    Home Medications:  Etonogestrel, fluticasone, loratadine, and sertraline    Allergies:  She has No Known Allergies.       Vital Signs:   Temp:  [97.4 °F (36.3 °C)] 97.4 °F (36.3 °C)  ENT Physical Exam  Ear  Hearing: intact;  Auricles: right auricle normal; left auricle normal;  External Mastoids: right external mastoid normal; left external mastoid normal;  Ear Canals: right ear canal normal; left ear canal normal;  Tympanic Membranes: bilateral tympanic membranes abnormal; with myringosclerosis;     Tympanometry    Date/Time: 9/8/2022 1:07 PM  Performed by: Pauline Mcqueen APRN  Authorized by: Pauline Mcqueen APRN   Consent: Verbal consent obtained.  Consent given by: patient  Comments: Type A bilaterally  Patient complains of pain during procedure         Result Review    RESULTS REVIEW    I have reviewed the patients old records in the chart.     Assessment & Plan    Diagnoses and all orders for this visit:    1. Otalgia of both ears (Primary)    Other orders  -     Tympanometry                  No follow-ups on file.      ZO Means  09/08/22  13:08 CDT     Physician Attestation  I have seen and examined Marcie Roberts and have reviewed the notes, assessments, and/or procedures and I concur with this documentation.    Marcie Roberts is a 22 y.o. female presents for evaluation of ear problems. She has had a history of eustachian tube dysfunction with chronic bilateral ear pressure that has not been relieved with medical management..     EXTERNAL EAR CANALS: normal ear canals without stenosis or significant cerumen  TYMPANIC MEMBRANES: tympanic membrane appearance normal, no lesions, no perforation, normal mobility, no fluid      Tympanogram today shows bilateral type C tympanograms.  Previously she had type A's.        ASSESSMENT:  1. Otalgia of both ears         PLAN:  Medical and surgical options were discussed including observation versus surgical management. Risks,  benefits and alternatives were discussed and questions were answered. After considering the options, it was decided that myringotomy tube insertion was the best option.  I recommended tubes in the office but she is not able to be calm enough to perform this.  We will proceed in the operating room.  I discussed with her there is nothing ability for me to know as I am doing it with her asleep whether this helps or not.  We will proceed with tube insertion but there is a possibility of removal if it does not help her out.    INFORMED CONSENT DISCUSSION:  MYRINGOTOMY TUBE INSERTION: The risks and benefits of myringotomy tube insertion were explained including but not limited to pain, aural fullness, bleeding, infection, risks of the anesthesia, persistent tympanic membrane perforation, chronic otorrhea, early and late extrusion, and the possibility for the need of reinsertion after extrusion. Alternatives were discussed. The patient/parents demonstrated understanding of these risks. Questions were asked appropriately answered.   .      PREOPERATIVE WORKUP:   Per anesthesia    Return for follow up postoperatively.  If she improves with tubes, we will consider eustachian tube dilation in the future.        Electronically signed by Idris Blancas MD, 09/08/22, 1:39 PM CDT.

## 2022-09-12 ENCOUNTER — PRE-ADMISSION TESTING (OUTPATIENT)
Dept: PREADMISSION TESTING | Facility: HOSPITAL | Age: 22
End: 2022-09-12

## 2022-09-12 VITALS
SYSTOLIC BLOOD PRESSURE: 110 MMHG | HEART RATE: 75 BPM | WEIGHT: 145.06 LBS | BODY MASS INDEX: 26.69 KG/M2 | HEIGHT: 62 IN | RESPIRATION RATE: 16 BRPM | OXYGEN SATURATION: 99 % | DIASTOLIC BLOOD PRESSURE: 64 MMHG

## 2022-09-12 LAB
ALBUMIN SERPL-MCNC: 4.6 G/DL (ref 3.5–5.2)
ALBUMIN/GLOB SERPL: 2 G/DL
ALP SERPL-CCNC: 91 U/L (ref 39–117)
ALT SERPL W P-5'-P-CCNC: 20 U/L (ref 1–33)
ANION GAP SERPL CALCULATED.3IONS-SCNC: 7 MMOL/L (ref 5–15)
AST SERPL-CCNC: 18 U/L (ref 1–32)
BILIRUB SERPL-MCNC: 0.6 MG/DL (ref 0–1.2)
BUN SERPL-MCNC: 12 MG/DL (ref 6–20)
BUN/CREAT SERPL: 20 (ref 7–25)
CALCIUM SPEC-SCNC: 9.1 MG/DL (ref 8.6–10.5)
CHLORIDE SERPL-SCNC: 108 MMOL/L (ref 98–107)
CO2 SERPL-SCNC: 25 MMOL/L (ref 22–29)
CREAT SERPL-MCNC: 0.6 MG/DL (ref 0.57–1)
DEPRECATED RDW RBC AUTO: 39 FL (ref 37–54)
EGFRCR SERPLBLD CKD-EPI 2021: 129.5 ML/MIN/1.73
ERYTHROCYTE [DISTWIDTH] IN BLOOD BY AUTOMATED COUNT: 12.2 % (ref 12.3–15.4)
GLOBULIN UR ELPH-MCNC: 2.3 GM/DL
GLUCOSE SERPL-MCNC: 85 MG/DL (ref 65–99)
HCT VFR BLD AUTO: 39.4 % (ref 34–46.6)
HGB BLD-MCNC: 13.7 G/DL (ref 12–15.9)
MCH RBC QN AUTO: 30.6 PG (ref 26.6–33)
MCHC RBC AUTO-ENTMCNC: 34.8 G/DL (ref 31.5–35.7)
MCV RBC AUTO: 87.9 FL (ref 79–97)
PLATELET # BLD AUTO: 344 10*3/MM3 (ref 140–450)
PMV BLD AUTO: 8.8 FL (ref 6–12)
POTASSIUM SERPL-SCNC: 3.6 MMOL/L (ref 3.5–5.2)
PROT SERPL-MCNC: 6.9 G/DL (ref 6–8.5)
RBC # BLD AUTO: 4.48 10*6/MM3 (ref 3.77–5.28)
SODIUM SERPL-SCNC: 140 MMOL/L (ref 136–145)
WBC NRBC COR # BLD: 4.72 10*3/MM3 (ref 3.4–10.8)

## 2022-09-12 PROCEDURE — 36415 COLL VENOUS BLD VENIPUNCTURE: CPT

## 2022-09-12 PROCEDURE — 85027 COMPLETE CBC AUTOMATED: CPT

## 2022-09-12 PROCEDURE — 80053 COMPREHEN METABOLIC PANEL: CPT

## 2022-09-13 ENCOUNTER — TELEPHONE (OUTPATIENT)
Dept: OTOLARYNGOLOGY | Facility: CLINIC | Age: 22
End: 2022-09-13

## 2022-09-13 NOTE — TELEPHONE ENCOUNTER
Call placed and informed patient of surgery arrival time of 0715 9/14/2022 with nothing to eat or drink after midnight, patient verbalized understanding.

## 2022-09-14 ENCOUNTER — ANESTHESIA EVENT (OUTPATIENT)
Dept: PERIOP | Facility: HOSPITAL | Age: 22
End: 2022-09-14

## 2022-09-14 ENCOUNTER — HOSPITAL ENCOUNTER (OUTPATIENT)
Facility: HOSPITAL | Age: 22
Setting detail: HOSPITAL OUTPATIENT SURGERY
Discharge: HOME | End: 2022-09-14
Attending: OTOLARYNGOLOGY | Admitting: OTOLARYNGOLOGY

## 2022-09-14 ENCOUNTER — ANESTHESIA (OUTPATIENT)
Dept: PERIOP | Facility: HOSPITAL | Age: 22
End: 2022-09-14

## 2022-09-14 VITALS
TEMPERATURE: 97.4 F | RESPIRATION RATE: 20 BRPM | SYSTOLIC BLOOD PRESSURE: 111 MMHG | OXYGEN SATURATION: 97 % | DIASTOLIC BLOOD PRESSURE: 76 MMHG | HEART RATE: 78 BPM

## 2022-09-14 PROBLEM — Z96.22 S/P BILATERAL MYRINGOTOMY WITH TUBE PLACEMENT: Status: ACTIVE | Noted: 2022-09-14

## 2022-09-14 LAB — B-HCG UR QL: NEGATIVE

## 2022-09-14 PROCEDURE — 69436 CREATE EARDRUM OPENING: CPT | Performed by: OTOLARYNGOLOGY

## 2022-09-14 PROCEDURE — C1889 IMPLANT/INSERT DEVICE, NOC: HCPCS | Performed by: OTOLARYNGOLOGY

## 2022-09-14 PROCEDURE — 81025 URINE PREGNANCY TEST: CPT | Performed by: OTOLARYNGOLOGY

## 2022-09-14 PROCEDURE — 25010000002 PROPOFOL 10 MG/ML EMULSION: Performed by: NURSE ANESTHETIST, CERTIFIED REGISTERED

## 2022-09-14 PROCEDURE — 25010000002 DEXAMETHASONE PER 1 MG: Performed by: ANESTHESIOLOGY

## 2022-09-14 PROCEDURE — 25010000002 DROPERIDOL PER 5 MG: Performed by: ANESTHESIOLOGY

## 2022-09-14 DEVICE — TBG EAR GROM ARMSTR MOD BVL FLPL 1.14MM STRL: Type: IMPLANTABLE DEVICE | Site: EAR | Status: FUNCTIONAL

## 2022-09-14 RX ORDER — LABETALOL HYDROCHLORIDE 5 MG/ML
5 INJECTION, SOLUTION INTRAVENOUS
Status: DISCONTINUED | OUTPATIENT
Start: 2022-09-14 | End: 2022-09-14 | Stop reason: HOSPADM

## 2022-09-14 RX ORDER — OXYCODONE AND ACETAMINOPHEN 10; 325 MG/1; MG/1
1 TABLET ORAL ONCE AS NEEDED
Status: COMPLETED | OUTPATIENT
Start: 2022-09-14 | End: 2022-09-14

## 2022-09-14 RX ORDER — DROPERIDOL 2.5 MG/ML
0.62 INJECTION, SOLUTION INTRAMUSCULAR; INTRAVENOUS ONCE AS NEEDED
Status: COMPLETED | OUTPATIENT
Start: 2022-09-14 | End: 2022-09-14

## 2022-09-14 RX ORDER — OXYCODONE AND ACETAMINOPHEN 7.5; 325 MG/1; MG/1
2 TABLET ORAL EVERY 4 HOURS PRN
Status: DISCONTINUED | OUTPATIENT
Start: 2022-09-14 | End: 2022-09-14 | Stop reason: HOSPADM

## 2022-09-14 RX ORDER — ONDANSETRON 2 MG/ML
4 INJECTION INTRAMUSCULAR; INTRAVENOUS ONCE AS NEEDED
Status: DISCONTINUED | OUTPATIENT
Start: 2022-09-14 | End: 2022-09-14 | Stop reason: HOSPADM

## 2022-09-14 RX ORDER — LIDOCAINE HYDROCHLORIDE 10 MG/ML
0.5 INJECTION, SOLUTION EPIDURAL; INFILTRATION; INTRACAUDAL; PERINEURAL ONCE AS NEEDED
Status: DISCONTINUED | OUTPATIENT
Start: 2022-09-14 | End: 2022-09-14 | Stop reason: SDUPTHER

## 2022-09-14 RX ORDER — SODIUM CHLORIDE 0.9 % (FLUSH) 0.9 %
10 SYRINGE (ML) INJECTION EVERY 12 HOURS SCHEDULED
Status: DISCONTINUED | OUTPATIENT
Start: 2022-09-14 | End: 2022-09-14 | Stop reason: HOSPADM

## 2022-09-14 RX ORDER — LIDOCAINE HYDROCHLORIDE 10 MG/ML
0.5 INJECTION, SOLUTION EPIDURAL; INFILTRATION; INTRACAUDAL; PERINEURAL ONCE AS NEEDED
Status: DISCONTINUED | OUTPATIENT
Start: 2022-09-14 | End: 2022-09-14 | Stop reason: HOSPADM

## 2022-09-14 RX ORDER — CIPROFLOXACIN AND DEXAMETHASONE 3; 1 MG/ML; MG/ML
4 SUSPENSION/ DROPS AURICULAR (OTIC) 2 TIMES DAILY
Status: DISCONTINUED | OUTPATIENT
Start: 2022-09-14 | End: 2022-09-14 | Stop reason: HOSPADM

## 2022-09-14 RX ORDER — CIPROFLOXACIN AND DEXAMETHASONE 3; 1 MG/ML; MG/ML
SUSPENSION/ DROPS AURICULAR (OTIC) AS NEEDED
Status: DISCONTINUED | OUTPATIENT
Start: 2022-09-14 | End: 2022-09-14 | Stop reason: HOSPADM

## 2022-09-14 RX ORDER — DEXAMETHASONE SODIUM PHOSPHATE 4 MG/ML
4 INJECTION, SOLUTION INTRA-ARTICULAR; INTRALESIONAL; INTRAMUSCULAR; INTRAVENOUS; SOFT TISSUE ONCE AS NEEDED
Status: COMPLETED | OUTPATIENT
Start: 2022-09-14 | End: 2022-09-14

## 2022-09-14 RX ORDER — NALOXONE HCL 0.4 MG/ML
0.4 VIAL (ML) INJECTION AS NEEDED
Status: DISCONTINUED | OUTPATIENT
Start: 2022-09-14 | End: 2022-09-14 | Stop reason: HOSPADM

## 2022-09-14 RX ORDER — SCOLOPAMINE TRANSDERMAL SYSTEM 1 MG/1
1 PATCH, EXTENDED RELEASE TRANSDERMAL ONCE
Status: DISCONTINUED | OUTPATIENT
Start: 2022-09-14 | End: 2022-09-14 | Stop reason: HOSPADM

## 2022-09-14 RX ORDER — MIDAZOLAM HYDROCHLORIDE 1 MG/ML
1 INJECTION INTRAMUSCULAR; INTRAVENOUS
Status: DISCONTINUED | OUTPATIENT
Start: 2022-09-14 | End: 2022-09-14 | Stop reason: HOSPADM

## 2022-09-14 RX ORDER — PROPOFOL 10 MG/ML
VIAL (ML) INTRAVENOUS AS NEEDED
Status: DISCONTINUED | OUTPATIENT
Start: 2022-09-14 | End: 2022-09-14 | Stop reason: SURG

## 2022-09-14 RX ORDER — LIDOCAINE HYDROCHLORIDE 20 MG/ML
INJECTION, SOLUTION EPIDURAL; INFILTRATION; INTRACAUDAL; PERINEURAL AS NEEDED
Status: DISCONTINUED | OUTPATIENT
Start: 2022-09-14 | End: 2022-09-14 | Stop reason: SURG

## 2022-09-14 RX ORDER — FENTANYL CITRATE 50 UG/ML
25 INJECTION, SOLUTION INTRAMUSCULAR; INTRAVENOUS
Status: DISCONTINUED | OUTPATIENT
Start: 2022-09-14 | End: 2022-09-14 | Stop reason: HOSPADM

## 2022-09-14 RX ORDER — DEXTROSE MONOHYDRATE 25 G/50ML
12.5 INJECTION, SOLUTION INTRAVENOUS AS NEEDED
Status: DISCONTINUED | OUTPATIENT
Start: 2022-09-14 | End: 2022-09-14 | Stop reason: HOSPADM

## 2022-09-14 RX ORDER — SODIUM CHLORIDE 0.9 % (FLUSH) 0.9 %
10 SYRINGE (ML) INJECTION AS NEEDED
Status: DISCONTINUED | OUTPATIENT
Start: 2022-09-14 | End: 2022-09-14 | Stop reason: HOSPADM

## 2022-09-14 RX ORDER — IBUPROFEN 600 MG/1
600 TABLET ORAL ONCE AS NEEDED
Status: DISCONTINUED | OUTPATIENT
Start: 2022-09-14 | End: 2022-09-14 | Stop reason: HOSPADM

## 2022-09-14 RX ORDER — SODIUM CHLORIDE, SODIUM LACTATE, POTASSIUM CHLORIDE, CALCIUM CHLORIDE 600; 310; 30; 20 MG/100ML; MG/100ML; MG/100ML; MG/100ML
1000 INJECTION, SOLUTION INTRAVENOUS CONTINUOUS
Status: DISCONTINUED | OUTPATIENT
Start: 2022-09-14 | End: 2022-09-14 | Stop reason: HOSPADM

## 2022-09-14 RX ORDER — SODIUM CHLORIDE 0.9 % (FLUSH) 0.9 %
3 SYRINGE (ML) INJECTION AS NEEDED
Status: DISCONTINUED | OUTPATIENT
Start: 2022-09-14 | End: 2022-09-14 | Stop reason: HOSPADM

## 2022-09-14 RX ORDER — FLUMAZENIL 0.1 MG/ML
0.2 INJECTION INTRAVENOUS AS NEEDED
Status: DISCONTINUED | OUTPATIENT
Start: 2022-09-14 | End: 2022-09-14 | Stop reason: HOSPADM

## 2022-09-14 RX ORDER — SODIUM CHLORIDE, SODIUM LACTATE, POTASSIUM CHLORIDE, CALCIUM CHLORIDE 600; 310; 30; 20 MG/100ML; MG/100ML; MG/100ML; MG/100ML
9 INJECTION, SOLUTION INTRAVENOUS CONTINUOUS
Status: DISCONTINUED | OUTPATIENT
Start: 2022-09-14 | End: 2022-09-14 | Stop reason: HOSPADM

## 2022-09-14 RX ADMIN — DEXAMETHASONE SODIUM PHOSPHATE 4 MG: 4 INJECTION INTRA-ARTICULAR; INTRALESIONAL; INTRAMUSCULAR; INTRAVENOUS; SOFT TISSUE at 09:39

## 2022-09-14 RX ADMIN — DROPERIDOL 0.62 MG: 2.5 INJECTION, SOLUTION INTRAMUSCULAR; INTRAVENOUS at 11:06

## 2022-09-14 RX ADMIN — PROPOFOL 100 MG: 10 INJECTION, EMULSION INTRAVENOUS at 10:40

## 2022-09-14 RX ADMIN — SCOPALAMINE 1 PATCH: 1 PATCH, EXTENDED RELEASE TRANSDERMAL at 09:38

## 2022-09-14 RX ADMIN — SODIUM CHLORIDE, POTASSIUM CHLORIDE, SODIUM LACTATE AND CALCIUM CHLORIDE 1000 ML: 600; 310; 30; 20 INJECTION, SOLUTION INTRAVENOUS at 09:00

## 2022-09-14 RX ADMIN — LIDOCAINE HYDROCHLORIDE 80 MG: 20 INJECTION, SOLUTION EPIDURAL; INFILTRATION; INTRACAUDAL; PERINEURAL at 10:40

## 2022-09-14 RX ADMIN — OXYCODONE AND ACETAMINOPHEN 1 TABLET: 325; 10 TABLET ORAL at 11:10

## 2022-09-14 NOTE — ANESTHESIA PREPROCEDURE EVALUATION
Anesthesia Evaluation     Patient summary reviewed   NPO Solid Status: > 8 hours             Airway   Mallampati: II  Dental      Pulmonary    (-) COPD, asthma, sleep apnea, not a smoker  Cardiovascular   Exercise tolerance: excellent (>7 METS)    (-) pacemaker, past MI, angina, cardiac stents      Neuro/Psych  (-) seizures, TIA, CVA  GI/Hepatic/Renal/Endo    (-) GERD, liver disease, no renal disease, diabetes    Musculoskeletal     Abdominal    Substance History      OB/GYN    (-)  Pregnant        Other                        Anesthesia Plan    ASA 1     general     intravenous induction     Anesthetic plan, risks, benefits, and alternatives have been provided, discussed and informed consent has been obtained with: patient.        CODE STATUS:

## 2022-09-14 NOTE — ANESTHESIA POSTPROCEDURE EVALUATION
Patient: Marcie Roberts    Procedure Summary     Date: 09/14/22 Room / Location:  [facility] OR 03 /  [facility]    Anesthesia Start: 1037 Anesthesia Stop: 1051    Procedure: myringotomy tube insertion (Bilateral Ear) Diagnosis:       Otalgia of both ears      Eustachian tube dysfunction, bilateral      (Otalgia of both ears [H92.03])      (Eustachian tube dysfunction, bilateral [H69.83])    Surgeons: Idris Blancas MD Provider: Jakub Zazueta CRNA    Anesthesia Type: general ASA Status: 1          Anesthesia Type: general    Vitals  Vitals Value Taken Time   /71 09/14/22 1105   Temp 97.4 °F (36.3 °C) 09/14/22 1050   Pulse 85 09/14/22 1105   Resp 22 09/14/22 1105   SpO2 100 % 09/14/22 1105           Post Anesthesia Care and Evaluation    Patient location during evaluation: PACU  Patient participation: complete - patient participated  Level of consciousness: awake and alert  Pain management: adequate    Airway patency: patent  Anesthetic complications: No anesthetic complications    Cardiovascular status: acceptable  Respiratory status: acceptable  Hydration status: acceptable    Comments: Blood pressure 111/76, pulse 78, temperature 97.4 °F (36.3 °C), temperature source Temporal, resp. rate 20, SpO2 97 %, currently breastfeeding.    Pt discharged from PACU based on gabriella score >8

## 2022-09-14 NOTE — OP NOTE
Idris Blancas MD   OPERATIVE NOTE    Marcie Roberts  9/14/2022    Pre-op Diagnosis:   Otalgia of both ears [H92.03]  Eustachian tube dysfunction, bilateral [H69.83]    Post-op Diagnosis:     Post-Op Diagnosis Codes:     * Otalgia of both ears [H92.03]     * Eustachian tube dysfunction, bilateral [H69.83]    Procedure/CPT® Codes:  bilateral myringotomy tube insertion [64687]    Anesthesia:   General    Staff:   Circulator: Mae Martinez RN  Scrub Person: Sparkle Perry    Estimated Blood Loss:   minimal    Specimens:   none      Drains:   none    FINDINGS:  EXTERNAL EAR CANALS: normal ear canals without stenosis with mild non- obstructing cerumen that was removed  TYMPANIC MEMBRANES: bilateral tympanic membrane with inflammation, mucoid effusion present    Complications: none    Reason for the Operation: Marcie Roberts is a 22 y.o. female who has had a history of chronic/ recurrent ear disease.  The risks and benefits of myringotomy tube insertion were explained including but not limited to pain, aural fullness, bleeding, infection, risks of the anesthesia, persistent tympanic membrane perforation, chronic otorrhea, early and late extrusion, and the possibility for the need of reinsertion after extrusion. Alternatives were discussed.  Questions were asked appropriately answered.      Procedure Description:  The patient was taken back to the operating room, placed supine on the operating table and placed under anesthesia by the anesthesia staff. Once this was done a time out was performed to confirm the patient and the proper procedure. After this was done the operating microscope was wheeled into view. Using the speculum and curette, the external auditory canal was cleaned of its cerumen and this exposed the tympanic membrane. A myringotomy was created in a radial fashion. After suctioning, a Estevez modified beveled tube was placed in the myringotomy. The same procedure was then carried out on  the opposite side in the same manner. Medicated drops were placed: Ciprodex.  The patient was then turned over to the anesthesia team and allowed to wake from anesthesia. The patient was transported to the recovery room in a stable condition.     Idris Blancas MD      Date: 9/14/2022  Time: 10:46 CDT

## 2023-01-18 ENCOUNTER — OFFICE VISIT (OUTPATIENT)
Dept: OTOLARYNGOLOGY | Facility: CLINIC | Age: 23
End: 2023-01-18
Payer: COMMERCIAL

## 2023-01-18 VITALS — BODY MASS INDEX: 28.74 KG/M2 | WEIGHT: 152.2 LBS | HEIGHT: 61 IN | TEMPERATURE: 97.5 F

## 2023-01-18 DIAGNOSIS — Z96.22 S/P BILATERAL MYRINGOTOMY WITH TUBE PLACEMENT: ICD-10-CM

## 2023-01-18 DIAGNOSIS — H69.83 EUSTACHIAN TUBE DYSFUNCTION, BILATERAL: Primary | ICD-10-CM

## 2023-01-18 PROCEDURE — 99212 OFFICE O/P EST SF 10 MIN: CPT | Performed by: EMERGENCY MEDICINE

## 2023-01-18 NOTE — PROGRESS NOTES
ZO Means  FER ENT [facility] EAR NOSE & THROAT  [address], [address]  [facility] [address]  Fax [phone]  Phone [phone]      Visit Type: FOLLOW UP   Chief Complaint   Patient presents with   • Ear Problem     Tube f/u        HPI  Marcie Roberts is a 22 y.o.  female who presents for follow up s/p myringotomy tube insertion - Bilateral on 9/14/2022. The patient has had a relatively normal postoperative course and currently has no related complaints.    Past Medical History:   Diagnosis Date   • ADHD    • GERD (gastroesophageal reflux disease)    • H. pylori infection        Past Surgical History:   Procedure Laterality Date   • APPENDECTOMY     • CHOLECYSTECTOMY     • EAR TUBES     • ENDOSCOPY  04/07/2017    Focal active chronic gastritis, Few Helicobacter organisms Dr. Moses   • LAPAROSCOPIC CHOLECYSTECTOMY     • MYRINGOTOMY W/ TUBES Bilateral 9/14/2022    Procedure: myringotomy tube insertion;  Surgeon: Idris Blancas MD;  Location: [facility];  Service: ENT;  Laterality: Bilateral;   • TONSILLECTOMY AND ADENOIDECTOMY     • WISDOM TOOTH EXTRACTION         Family History: Her family history includes ADD / ADHD in an other family member; Bipolar disorder in an other family member; Breast cancer in an other family member; Cancer in an other family member; Diabetes in an other family member; Heart disease in an other family member; No Known Problems in her brother, daughter, half-brother, half-brother, half-sister, half-sister, sister, and sister; Other in an other family member; Stomach cancer in an other family member; Stroke in an other family member.     Social History: She  reports that she quit smoking about 3 years ago. Her smoking use included cigarettes. She has never used smokeless tobacco. She reports that she does not currently use alcohol after a past usage of about 1.0 standard drink per week. She reports that she  does not use drugs.    Home Medications:  acetaminophen and ibuprofen    Allergies:  She is allergic to cefoxitin sodium-dextrose.       Vital Signs:   Temp:  [97.5 °F (36.4 °C)] 97.5 °F (36.4 °C)  ENT Physical Exam  Constitutional  Appearance: patient appears well-developed, well-nourished and well-groomed,  Communication/Voice: communication appropriate for developmental age; vocal quality normal;  Ear  Hearing: intact;  Auricles: right auricle normal; left auricle normal;  External Mastoids: right external mastoid normal; left external mastoid normal;  Ear Canals: right ear canal normal; left ear canal normal;  Tympanic Membranes: bilateral tympanic membranes tympanostomy tubes noted;  Ear comments: Tubes dry and patent bilaterally         Result Review    RESULTS REVIEW    I have reviewed the patients old records in the chart.     Assessment & Plan    Diagnoses and all orders for this visit:    1. Eustachian tube dysfunction, bilateral (Primary)  -     Comprehensive Hearing Test; Future    2. S/p bilateral myringotomy with tube placement  -     Comprehensive Hearing Test; Future       Protect getting water in the ears. If needed, may use over the counter silicone plugs or a cotton ball coated with vasoline when bathing.  Use hairdryer on a cool setting after bathing.  For proper use of ear drops, push on tragus (cartilage in front of ear canal) after drop placement.    Return in about 6 months (around 7/18/2023) for Follow up with ZO Holloway for tube follow up, Follow up with Audiogram.      ZO Means   01/18/23  10:13 CST

## 2023-05-30 RX ORDER — FLUTICASONE PROPIONATE 50 MCG
SPRAY, SUSPENSION (ML) NASAL
Qty: 16 ML | Refills: 6 | Status: SHIPPED | OUTPATIENT
Start: 2023-05-30

## 2023-08-02 RX ORDER — MAGNESIUM OXIDE 400 MG/1
400 TABLET ORAL DAILY
Qty: 30 TABLET | Refills: 1 | Status: SHIPPED | OUTPATIENT
Start: 2023-08-02

## 2023-08-21 ENCOUNTER — LAB (OUTPATIENT)
Dept: LAB | Facility: HOSPITAL | Age: 23
End: 2023-08-21
Payer: COMMERCIAL

## 2023-08-21 ENCOUNTER — INITIAL PRENATAL (OUTPATIENT)
Dept: OBSTETRICS AND GYNECOLOGY | Facility: CLINIC | Age: 23
End: 2023-08-21
Payer: COMMERCIAL

## 2023-08-21 VITALS — SYSTOLIC BLOOD PRESSURE: 104 MMHG | DIASTOLIC BLOOD PRESSURE: 62 MMHG | WEIGHT: 156 LBS | BODY MASS INDEX: 29.48 KG/M2

## 2023-08-21 DIAGNOSIS — Z34.80 PRENATAL CARE OF MULTIGRAVIDA, ANTEPARTUM: Primary | ICD-10-CM

## 2023-08-21 DIAGNOSIS — Z36.87 UNSURE OF LMP (LAST MENSTRUAL PERIOD) AS REASON FOR ULTRASOUND SCAN: ICD-10-CM

## 2023-08-21 DIAGNOSIS — O21.9 NAUSEA AND VOMITING IN PREGNANCY: ICD-10-CM

## 2023-08-21 DIAGNOSIS — Z32.01 POSITIVE PREGNANCY TEST: ICD-10-CM

## 2023-08-21 DIAGNOSIS — Z32.00 PREGNANCY EXAMINATION OR TEST, PREGNANCY UNCONFIRMED: ICD-10-CM

## 2023-08-21 DIAGNOSIS — O36.80X0 ENCOUNTER TO DETERMINE FETAL VIABILITY OF PREGNANCY, SINGLE OR UNSPECIFIED FETUS: ICD-10-CM

## 2023-08-21 LAB
ABO GROUP BLD: NORMAL
AMPHET+METHAMPHET UR QL: NEGATIVE
AMPHETAMINES UR QL: NEGATIVE
B-HCG UR QL: POSITIVE
BARBITURATES UR QL SCN: NEGATIVE
BASOPHILS # BLD AUTO: 0.02 10*3/MM3 (ref 0–0.2)
BASOPHILS NFR BLD AUTO: 0.2 % (ref 0–1.5)
BENZODIAZ UR QL SCN: NEGATIVE
BILIRUB UR QL STRIP: NEGATIVE
BLD GP AB SCN SERPL QL: NEGATIVE
BUPRENORPHINE SERPL-MCNC: NEGATIVE NG/ML
CANNABINOIDS SERPL QL: NEGATIVE
CLARITY UR: CLEAR
COCAINE UR QL: NEGATIVE
COLOR UR: YELLOW
DEPRECATED RDW RBC AUTO: 38.5 FL (ref 37–54)
EOSINOPHIL # BLD AUTO: 0.02 10*3/MM3 (ref 0–0.4)
EOSINOPHIL NFR BLD AUTO: 0.2 % (ref 0.3–6.2)
ERYTHROCYTE [DISTWIDTH] IN BLOOD BY AUTOMATED COUNT: 12.5 % (ref 12.3–15.4)
EXPIRATION DATE: ABNORMAL
FENTANYL UR-MCNC: NEGATIVE NG/ML
GLUCOSE UR STRIP-MCNC: NEGATIVE MG/DL
HBV SURFACE AG SERPL QL IA: NORMAL
HCG INTACT+B SERPL-ACNC: NORMAL MIU/ML
HCT VFR BLD AUTO: 40.6 % (ref 34–46.6)
HCV AB SER DONR QL: NORMAL
HGB BLD-MCNC: 14.4 G/DL (ref 12–15.9)
HGB UR QL STRIP.AUTO: NEGATIVE
HIV 1+2 AB+HIV1 P24 AG SERPL QL IA: NORMAL
HOLD SPECIMEN: NORMAL
IMM GRANULOCYTES # BLD AUTO: 0.03 10*3/MM3 (ref 0–0.05)
IMM GRANULOCYTES NFR BLD AUTO: 0.3 % (ref 0–0.5)
INTERNAL NEGATIVE CONTROL: NEGATIVE
INTERNAL POSITIVE CONTROL: POSITIVE
KETONES UR QL STRIP: NEGATIVE
LEUKOCYTE ESTERASE UR QL STRIP.AUTO: ABNORMAL
LYMPHOCYTES # BLD AUTO: 1.72 10*3/MM3 (ref 0.7–3.1)
LYMPHOCYTES NFR BLD AUTO: 19.7 % (ref 19.6–45.3)
Lab: ABNORMAL
Lab: NORMAL
MCH RBC QN AUTO: 30.8 PG (ref 26.6–33)
MCHC RBC AUTO-ENTMCNC: 35.5 G/DL (ref 31.5–35.7)
MCV RBC AUTO: 86.9 FL (ref 79–97)
METHADONE UR QL SCN: NEGATIVE
MONOCYTES # BLD AUTO: 0.36 10*3/MM3 (ref 0.1–0.9)
MONOCYTES NFR BLD AUTO: 4.1 % (ref 5–12)
NEUTROPHILS NFR BLD AUTO: 6.59 10*3/MM3 (ref 1.7–7)
NEUTROPHILS NFR BLD AUTO: 75.5 % (ref 42.7–76)
NITRITE UR QL STRIP: NEGATIVE
NRBC BLD AUTO-RTO: 0 /100 WBC (ref 0–0.2)
OPIATES UR QL: NEGATIVE
OXYCODONE UR QL SCN: NEGATIVE
PCP UR QL SCN: NEGATIVE
PH UR STRIP.AUTO: 6.5 [PH] (ref 5–8)
PLATELET # BLD AUTO: 455 10*3/MM3 (ref 140–450)
PMV BLD AUTO: 9 FL (ref 6–12)
PROPOXYPH UR QL: NEGATIVE
PROT UR QL STRIP: NEGATIVE
RBC # BLD AUTO: 4.67 10*6/MM3 (ref 3.77–5.28)
RH BLD: NEGATIVE
RPR SER QL: NORMAL
SP GR UR STRIP: 1.01 (ref 1–1.03)
TRICYCLICS UR QL SCN: NEGATIVE
UROBILINOGEN UR QL STRIP: ABNORMAL
WBC NRBC COR # BLD: 8.74 10*3/MM3 (ref 3.4–10.8)

## 2023-08-21 PROCEDURE — 87086 URINE CULTURE/COLONY COUNT: CPT

## 2023-08-21 PROCEDURE — 87591 N.GONORRHOEAE DNA AMP PROB: CPT

## 2023-08-21 PROCEDURE — 87491 CHLMYD TRACH DNA AMP PROBE: CPT

## 2023-08-21 PROCEDURE — 80081 OBSTETRIC PANEL INC HIV TSTG: CPT

## 2023-08-21 PROCEDURE — 81003 URINALYSIS AUTO W/O SCOPE: CPT

## 2023-08-21 PROCEDURE — 86803 HEPATITIS C AB TEST: CPT

## 2023-08-21 PROCEDURE — 81025 URINE PREGNANCY TEST: CPT

## 2023-08-21 PROCEDURE — 87661 TRICHOMONAS VAGINALIS AMPLIF: CPT

## 2023-08-21 PROCEDURE — 36415 COLL VENOUS BLD VENIPUNCTURE: CPT

## 2023-08-21 PROCEDURE — 80307 DRUG TEST PRSMV CHEM ANLYZR: CPT

## 2023-08-21 PROCEDURE — 84702 CHORIONIC GONADOTROPIN TEST: CPT

## 2023-08-21 RX ORDER — ONDANSETRON 4 MG/1
4 TABLET, ORALLY DISINTEGRATING ORAL EVERY 8 HOURS PRN
Qty: 30 TABLET | Refills: 3 | Status: SHIPPED | OUTPATIENT
Start: 2023-08-21

## 2023-08-21 RX ORDER — PRENATAL VIT NO.126/IRON/FOLIC 28MG-0.8MG
TABLET ORAL DAILY
COMMUNITY

## 2023-08-21 RX ORDER — CALCIUM CARBONATE 500 MG/1
1 TABLET, CHEWABLE ORAL DAILY
COMMUNITY

## 2023-08-21 NOTE — PROGRESS NOTES
I spent approximately 60 minutes with the patient acquiring the health and history intake, reviewing prior records, discussing topics related to healthy pregnancy, entering orders, and documentation. She is accompanied by her significant other. Her LMP is approximately 6/25/23. She had a positive UPT in the office today.  This is her 3rd pregnancy. She had a vaginal delivery in 2015 and 2021.   A newob bag is given. The 1st trimester teaching was done with the patient. We discussed a healthy diet and exercise and what is recommended. I also discussed Listeriosis and Toxoplasmosis. She does not eat fish. I informed patient not to be in hot tubs, saunas, or tanning beds. We discussed that spotting may occur after intercourse which is common, but if heavy bleeding like a period occurs to call the [facility] or hospital if clinic is closed.  I encouraged her to make an appointment with the dentist if she has not had a dental exam and cleaning in the last 6 months.  I instructed the patient that alcohol, illicit drug use, and tobacco smoking are not recommended in pregnancy.  She filled out the depression screening questionnaire and scored 11.  She plans to breastfeed and formula feed. I gave her a schedule of the upcoming prenatal education classes. I encouraged the patient to get the TDAP vaccine in the 3rd trimester.  I discussed with the patient that a pediatrician needs to be chosen prior to delivery for the infant to have an appointment scheduled before leaving the hospital.  I discussed lab tests will be done today. Her last pap smear was negative in September 2020 at [facility] (in media). All questions were answered at this time. She is scheduled for an ultrasound and to see Maryam PATHAK on 8/30/23.

## 2023-08-22 LAB
BACTERIA SPEC AEROBE CULT: NORMAL
C TRACH RRNA CVX QL NAA+PROBE: NEGATIVE
N GONORRHOEA RRNA SPEC QL NAA+PROBE: NEGATIVE
RUBV IGG SERPL IA-ACNC: 1 INDEX
TRICHOMONAS VAGINALIS PCR: NEGATIVE

## 2023-08-23 ENCOUNTER — REFERRAL TRIAGE (OUTPATIENT)
Dept: LABOR AND DELIVERY | Facility: HOSPITAL | Age: 23
End: 2023-08-23
Payer: COMMERCIAL

## 2023-09-11 ENCOUNTER — LAB (OUTPATIENT)
Dept: LAB | Facility: HOSPITAL | Age: 23
End: 2023-09-11
Payer: COMMERCIAL

## 2023-09-11 ENCOUNTER — INITIAL PRENATAL (OUTPATIENT)
Dept: OBSTETRICS AND GYNECOLOGY | Facility: CLINIC | Age: 23
End: 2023-09-11
Payer: COMMERCIAL

## 2023-09-11 VITALS — BODY MASS INDEX: 28.53 KG/M2 | DIASTOLIC BLOOD PRESSURE: 60 MMHG | SYSTOLIC BLOOD PRESSURE: 102 MMHG | WEIGHT: 151 LBS

## 2023-09-11 DIAGNOSIS — Z13.79 GENETIC SCREENING: ICD-10-CM

## 2023-09-11 DIAGNOSIS — Z34.80 PRENATAL CARE, SUBSEQUENT PREGNANCY, UNSPECIFIED TRIMESTER: ICD-10-CM

## 2023-09-11 DIAGNOSIS — O26.891 RH NEGATIVE STATUS DURING PREGNANCY IN FIRST TRIMESTER: ICD-10-CM

## 2023-09-11 DIAGNOSIS — Z3A.11 11 WEEKS GESTATION OF PREGNANCY: Primary | ICD-10-CM

## 2023-09-11 DIAGNOSIS — O21.9 NAUSEA AND VOMITING IN PREGNANCY PRIOR TO 22 WEEKS GESTATION: ICD-10-CM

## 2023-09-11 DIAGNOSIS — Z67.91 RH NEGATIVE STATUS DURING PREGNANCY IN FIRST TRIMESTER: ICD-10-CM

## 2023-09-11 DIAGNOSIS — Z64.1 MULTIGRAVIDA: ICD-10-CM

## 2023-09-11 PROCEDURE — 99214 OFFICE O/P EST MOD 30 MIN: CPT

## 2023-09-11 PROCEDURE — 36415 COLL VENOUS BLD VENIPUNCTURE: CPT

## 2023-09-11 RX ORDER — CETIRIZINE HYDROCHLORIDE 10 MG/1
10 TABLET ORAL DAILY
COMMUNITY
Start: 2023-08-29

## 2023-09-11 NOTE — PROGRESS NOTES
Psychiatric  Obstetrics  Date of Service: 2023    CHIEF COMPLAINT:  New prenatal visit    HISTORY OF PRESENT ILLNESS:  Marcie Roberts is a 23 y.o. y/o  at 11w1d by LMP (Patient's last menstrual period was 2023 (exact date).).  This was a planned pregnancy and the patient is supported by BARRERA Mary.  Reports  nausea with vomiting.  She is taking Zofran that is helping. Denies breast tenderness.  She denies any vaginal bleeding.  She has  started taking a prenatal vitamin.    REVIEW OF SYSTEMS  Review of Systems   Constitutional:  Negative for appetite change, chills, fatigue and fever.   Respiratory:  Negative for apnea, cough, choking, chest tightness, shortness of breath, wheezing and stridor.    Cardiovascular:  Negative for chest pain, palpitations and leg swelling.   Gastrointestinal:  Positive for nausea and vomiting. Negative for abdominal pain, constipation and diarrhea.   Genitourinary:  Negative for amenorrhea, breast discharge, breast lump, breast pain, decreased libido, decreased urine volume, difficulty urinating, dyspareunia, dysuria, flank pain, frequency, genital sores, hematuria, menstrual problem, pelvic pain, pelvic pressure, urgency, urinary incontinence, vaginal bleeding, vaginal discharge and vaginal pain.     PRENATAL RISK FACTORS   Problems (from 22 to present)       Problem Noted Resolved    Multigravida 2023 by Rossana Lynn APRN No    Nausea and vomiting in pregnancy prior to 22 weeks gestation 2023 by Rossana Lynn APRN No    Rh negative status during pregnancy in first trimester 2023 by Rossana Lynn APRN No            DATING CRITERIA:  LMP (22) -- VJ 3/31/24  1TUS (22 at 11w1d) -- VJ 3/31/24    OBSTETRIC HISTORY:  OB History    Para Term  AB Living   3 2 2     2   SAB IAB Ectopic Molar Multiple Live Births           0 2      # Outcome Date GA Lbr Zenon/2nd Weight Sex Delivery Anes PTL Lv   3  Current            2 Term 10/10/21 40w3d / 00:12 3310 g (7 lb 4.8 oz) F Vag-Spont None N BRI      Complications: Precipitant delivery   1 Term 04/20/15 40w6d  2892 g (6 lb 6 oz) F Vag-Spont EPI  BRI     GYN HISTORY:  Denies h/o sexually transmitted infections/pelvic inflammatory disease  Denies h/o abnormal pap smears  Last pap smear:   Last Completed Pap Smear            PAP SMEAR (Every 3 Years) Next due on 9/21/2024 09/21/2021  SCANNED - PAP SMEAR                  Denies h/o gynecologic surgeries, including biopsies of the cervix    PAST MEDICAL HISTORY:  Past Medical History:   Diagnosis Date    ADHD     Anxiety     Depression     GERD (gastroesophageal reflux disease)     H. pylori infection     Migraine     Ovarian cyst      PAST SURGICAL HISTORY:  Past Surgical History:   Procedure Laterality Date    APPENDECTOMY      CHOLECYSTECTOMY      EAR TUBES      ENDOSCOPY  04/07/2017    Focal active chronic gastritis, Few Helicobacter organisms Dr. Moses    LAPAROSCOPIC CHOLECYSTECTOMY      MYRINGOTOMY W/ TUBES Bilateral 9/14/2022    Procedure: myringotomy tube insertion;  Surgeon: Idris Blancas MD;  Location: [facility];  Service: ENT;  Laterality: Bilateral;    TONSILLECTOMY AND ADENOIDECTOMY      WISDOM TOOTH EXTRACTION       FAMILY HISTORY:  Family History   Problem Relation Age of Onset    Diabetes Mother     Heart disease Mother     ADD / ADHD Brother     Seizures Sister     ADD / ADHD Sister     No Known Problems Sister     ADD / ADHD Daughter     No Known Problems Daughter     Colon cancer Paternal Grandfather     Breast cancer Paternal Grandmother     No Known Problems Half-Sister     Anxiety disorder Half-Sister     Depression Half-Sister     ADD / ADHD Other     Bipolar disorder Other     Breast cancer Other     Cancer Other     Diabetes Other     Heart disease Other     Stroke Other     Stomach cancer Other     Other Other         respiratory disorder    Colon polyps Neg Hx      SOCIAL  HISTORY:  Social History     Socioeconomic History    Marital status:    Tobacco Use    Smoking status: Former     Packs/day: 0.00     Years: 5.00     Pack years: 0.00     Types: Cigarettes     Quit date: 2020     Years since quitting: 3.6    Smokeless tobacco: Never   Vaping Use    Vaping Use: Never used   Substance and Sexual Activity    Alcohol use: Not Currently     Alcohol/week: 1.0 standard drink     Types: 1 Cans of beer per week    Drug use: Never    Sexual activity: Yes     Partners: Male     Birth control/protection: Inserts     Comment: last pap smear 9/21/2020 at [facility]. patient signed release      GENETIC SCREENING:  Age >34 yo as of VJ: no  Thalassemia: no  NTD: no  CHD: no  Down Syndrome/MR/Fragile X/Autism: no  Ashkenazi Jainism with Andreas-Sachs, [place], familial dysautonomia: no  Sickle cell disease or trait: no  Hemophilia: no  Muscular dystrophy: no  Cystic fibrosis: no  Udall's chorea: no  Birth defects: no  Genetic/chromosomal disorders: no    INFECTION HISTORY:  TB exposure: no  HSV: no  Illness since LMP: no  Prior GBS infected child: no  STIs: no    ALLERGIES:  Allergies   Allergen Reactions    Cefoxitin Sodium-Dextrose Unknown - Low Severity       MEDICATIONS:  Prior to Admission medications    Medication Sig Start Date End Date Taking? Authorizing Provider   acetaminophen (TYLENOL) 160 MG/5ML elixir Take 15.6 mL by mouth Every 4 (Four) Hours As Needed for Mild Pain. 9/14/22  Yes Idris Blancas MD   calcium carbonate (TUMS) 500 MG chewable tablet Chew 1 tablet Daily.   Yes ProviderWale MD   cetirizine (zyrTEC) 10 MG tablet Take 1 tablet by mouth Daily. 8/29/23  Yes Wale Brush MD   ondansetron ODT (ZOFRAN-ODT) 4 MG disintegrating tablet Place 1 tablet on the tongue Every 8 (Eight) Hours As Needed for Nausea or Vomiting. 8/21/23  Yes Rossana Lynn APRN   prenatal vitamin (prenatal, CLASSIC, vitamin) tablet Take  by mouth Daily.   Yes  Provider, MD Wale   magnesium oxide (MAG-OX) 400 MG tablet Take 1 tablet by mouth Daily. 23  Antonietta BillieZO Verdugo       PHYSICAL EXAM:   /60   Wt 68.5 kg (151 lb)   LMP 2023 (Exact Date)   BMI 28.53 kg/m²   General: Alert, healthy, no distress, well nourished and well developed.  Neurologic: Alert, oriented to person, place, and time.  Gait normal.  Cranial nerves II-XII grossly intact.  HEENT: Normocephalic, atraumatic.  Extraocular muscles intact, pupils equal and reactive x2.    Teeth: Normal hygiene.  Neck: Supple, no adenopathy, thyroid normal size, non-tender, without nodularity, trachea midline.  Lungs: Normal respiratory effort.  Clear to auscultation bilaterally.  No wheezes, rhonci, or rales.  Heart: Regular rate and rhythm.  No murmer, rub or gallop.  Abdomen: Soft, non-tender, non-distended,no masses, no hepatosplenomegaly, no hernia.  Skin: No rash, no lesions.  Extremities: No cyanosis, clubbing or edema.      Prelim US: CRL: 4.38cm, GS seen, intrauterine, CL: 3.43cm, rt and lt ovary seen.     IMPRESSION:  Marcie Roberts is a 23 y.o.  at 11w1d for a new prenatal visit.    PLAN:  1.  IUP at 11w1d  - Prenatal labs reviewed. Rh negative.   - Genetic testing, including cystic fibrosis, was discussed and patient desires all.   - Continue prenatal vitamins  - Weight gain counseling performed.   - Pregravid BMI >30: Recommend 11-20 lb  - Return to clinic in 4 weeks for return prenatal visit     Diagnosis Plan   1. 11 weeks gestation of pregnancy        2. Prenatal care, subsequent pregnancy, unspecified trimester  Susana Panorama Prenatal Test: Chromosomes 13, 18, 21, X & Y: Triploidy 22Q.11.2 Deletion - Blood,    Susana Horizon 14 (Pan-Ethnic Standard) - Blood,      3. Genetic screening  Susana Panorama Prenatal Test: Chromosomes 13, 18, 21, X & Y: Triploidy 22Q.11.2 Deletion - Blood,    Susana Horizon 14 (Pan-Ethnic Standard) - Blood,      4. Nausea  and vomiting in pregnancy prior to 22 weeks gestation        5. Multigravida        6. Rh negative status during pregnancy in first trimester          Rossana Lynn, APRN  9/11/2023  13:45 CDT

## 2023-09-13 DIAGNOSIS — Z36.3 ENCOUNTER FOR ANTENATAL SCREENING FOR MALFORMATION USING ULTRASOUND: Primary | ICD-10-CM

## 2023-09-19 LAB
Lab: NEGATIVE
Lab: NORMAL
NTRA ALPHA-THALASSEMIA: NEGATIVE
NTRA BETA-HEMOGLOBINOPATHIES: NEGATIVE
NTRA CANAVAN DISEASE: NEGATIVE
NTRA CYSTIC FIBROSIS: NEGATIVE
NTRA DUCHENNE/BECKER MUSCULAR DYSTROPHY: NEGATIVE
NTRA FAMILIAL DYSAUTONOMIA: NEGATIVE
NTRA FRAGILE X SYNDROME: NEGATIVE
NTRA GALACTOSEMIA: NEGATIVE
NTRA GAUCHER DISEASE: NEGATIVE
NTRA MEDIUM CHAIN ACYL-COA DEHYDROGENASE DEFICIENCY: NEGATIVE
NTRA POLYCYSTIC KIDNEY DISEASE, AUTOSOMAL RECESSIVE: NEGATIVE
NTRA SMITH-LEMLI-OPITZ SYNDROME: NEGATIVE
NTRA SPINAL MUSCULAR ATROPHY: NEGATIVE
NTRA TAY-SACHS DISEASE: NEGATIVE

## 2024-03-06 ENCOUNTER — OFFICE VISIT (OUTPATIENT)
Dept: OTOLARYNGOLOGY | Facility: CLINIC | Age: 24
End: 2024-03-06
Payer: COMMERCIAL

## 2024-03-06 VITALS — WEIGHT: 176 LBS | HEIGHT: 61 IN | TEMPERATURE: 97.3 F | BODY MASS INDEX: 33.23 KG/M2

## 2024-03-06 DIAGNOSIS — H69.93 DYSFUNCTION OF BOTH EUSTACHIAN TUBES: ICD-10-CM

## 2024-03-06 DIAGNOSIS — Z96.22 S/P MYRINGOTOMY WITH INSERTION OF TUBE: Primary | ICD-10-CM

## 2024-03-06 RX ORDER — FLUTICASONE PROPIONATE 50 MCG
2 SPRAY, SUSPENSION (ML) NASAL DAILY
COMMUNITY

## 2024-03-06 NOTE — PROGRESS NOTES
ZO Means  FER ENT [facility] EAR NOSE & THROAT  [address], [address]  [facility] [address]  Fax [phone]  Phone [phone]      Visit Type: FOLLOW UP   Chief Complaint   Patient presents with    Ear Tube Follow-up    Earache           HPI  Marcie Roberts is a 23 y.o. female who presents status post myringotomy tube insertion. The patient has had: ear fullness, ear pressure, and decreased hearing The symptoms are localized to the left ear. The symptoms severity was described as : moderate The symptoms have been : relatively constant for the last several weeks There have been no identified factors that aggravate the symptoms. The symptoms are improved by myringotomy tube insertion. .    Patient reports she prefers going to the operating room for the procedure.  She is currently pregnant.      Past Medical History:   Diagnosis Date    ADHD     Anxiety     Depression     GERD (gastroesophageal reflux disease)     H. pylori infection     Migraine     Ovarian cyst        Past Surgical History:   Procedure Laterality Date    APPENDECTOMY      CHOLECYSTECTOMY      EAR TUBES      ENDOSCOPY  04/07/2017    Focal active chronic gastritis, Few Helicobacter organisms Dr. Moses    LAPAROSCOPIC CHOLECYSTECTOMY      MYRINGOTOMY W/ TUBES Bilateral 9/14/2022    Procedure: myringotomy tube insertion;  Surgeon: Idris Blancas MD;  Location: [facility];  Service: ENT;  Laterality: Bilateral;    TONSILLECTOMY AND ADENOIDECTOMY      WISDOM TOOTH EXTRACTION         Family History: Her family history includes ADD / ADHD in her brother, daughter, sister, and another family member; Anxiety disorder in her half-sister; Bipolar disorder in an other family member; Breast cancer in her paternal grandmother and another family member; Cancer in an other family member; Colon cancer in her paternal grandfather; Depression in her half-sister; Diabetes in her mother  and another family member; Heart disease in her mother and another family member; No Known Problems in her daughter, half-sister, and sister; Other in an other family member; Seizures in her sister; Stomach cancer in an other family member; Stroke in an other family member.     Social History: She  reports that she quit smoking about 9 years ago. Her smoking use included cigarettes. She has never used smokeless tobacco. She reports that she does not currently use alcohol after a past usage of about 1.0 standard drink of alcohol per week. She reports that she does not use drugs.    Home Medications:  acetaminophen, calcium carbonate, cetirizine, fluticasone, ondansetron ODT, and prenatal vitamin    Allergies:  She is allergic to cefoxitin sodium-dextrose.       Vital Signs:   Temp:  [97.3 °F (36.3 °C)] 97.3 °F (36.3 °C)  ENT Physical Exam  Ear  Hearing: intact;  Auricles: bilateral auricles normal;  Ear Canals: bilateral ear canals normal;  Tympanic Membranes: right tympanic membrane tympanostomy tube noted; normal tube; left tympanic membrane abnormal; dull;       Tympanometry    Date/Time: 3/6/2024 11:18 AM    Performed by: Pauline Mcqueen APRN  Authorized by: Pauline Mcqueen APRN  Consent: Verbal consent obtained.  Consent given by: patient  Comments: Right type B large ecv  Left type As normal ECV         Result Review       RESULTS REVIEW    I have reviewed the patients old records in the chart.        Assessment & Plan  S/P myringotomy with insertion of tube    Dysfunction of both eustachian tubes       Orders Placed This Encounter   Procedures    Tympanometry         Patient currently pregnant, we will have her return postpartum to have audio and plan for surgery  Return in about 8 weeks (around 5/1/2024) for Follow up with Audiogram, Follow up with ZO Brown.        Electronically signed by ZO Means 03/06/24 11:20 AM CST.

## 2024-11-12 ENCOUNTER — OFFICE VISIT (OUTPATIENT)
Dept: FAMILY MEDICINE CLINIC | Facility: CLINIC | Age: 24
End: 2024-11-12
Payer: COMMERCIAL

## 2024-11-12 VITALS
DIASTOLIC BLOOD PRESSURE: 67 MMHG | WEIGHT: 168 LBS | BODY MASS INDEX: 31.72 KG/M2 | TEMPERATURE: 97.8 F | HEIGHT: 61 IN | HEART RATE: 87 BPM | SYSTOLIC BLOOD PRESSURE: 102 MMHG | OXYGEN SATURATION: 98 %

## 2024-11-12 DIAGNOSIS — R68.82 LOW LIBIDO: Primary | ICD-10-CM

## 2024-11-12 DIAGNOSIS — R79.89 LOW TESTOSTERONE LEVEL IN FEMALE: ICD-10-CM

## 2024-11-12 PROCEDURE — 99213 OFFICE O/P EST LOW 20 MIN: CPT | Performed by: NURSE PRACTITIONER

## 2024-11-12 PROCEDURE — 1126F AMNT PAIN NOTED NONE PRSNT: CPT | Performed by: NURSE PRACTITIONER

## 2024-11-12 PROCEDURE — 1160F RVW MEDS BY RX/DR IN RCRD: CPT | Performed by: NURSE PRACTITIONER

## 2024-11-12 PROCEDURE — 1159F MED LIST DOCD IN RCRD: CPT | Performed by: NURSE PRACTITIONER

## 2024-11-12 NOTE — PROGRESS NOTES
CC:   Chief Complaint   Patient presents with    Consult     Patient wants to discuss testosterone        History:  Marcie Roberts is a 24 y.o. female who presents today for evaluation of the above problems.      HPI    Patient concerned about low testosterone. She went to a provider in September for low libido concerns that her testosterone was <3%. Patient is currently breastfeeding so could not get compounded testosterone from [facility]. States her low libido is affecting her relationship with . Patient had a baby about 6 months ago and has 2 other small children in the home. She denies any depression or anxiety. Discussed with patient that testosterone replacement is not recommended and encouraged her to reduce stress, eat a healthy diet and exercise. Once she stops breastfeeding she could look into compound medications for sex drive at [facility]. Patient is wanting her testosterone rechecked-     Allergies   Allergen Reactions    Cefoxitin Sodium-Dextrose Unknown - Low Severity     Past Medical History:   Diagnosis Date    ADHD     Anxiety     Depression     GERD (gastroesophageal reflux disease)     H. pylori infection     Migraine     Ovarian cyst      Past Surgical History:   Procedure Laterality Date    APPENDECTOMY      CHOLECYSTECTOMY      EAR TUBES      ENDOSCOPY  04/07/2017    Focal active chronic gastritis, Few Helicobacter organisms Dr. Moses    LAPAROSCOPIC CHOLECYSTECTOMY      MYRINGOTOMY W/ TUBES Bilateral 9/14/2022    Procedure: myringotomy tube insertion;  Surgeon: Idris Blancas MD;  Location: [facility] OR;  Service: ENT;  Laterality: Bilateral;    TONSILLECTOMY AND ADENOIDECTOMY      WISDOM TOOTH EXTRACTION       Family History   Problem Relation Age of Onset    Diabetes Mother     Heart disease Mother     ADD / ADHD Brother     Seizures Sister     ADD / ADHD Sister     No Known Problems Sister     ADD / ADHD Daughter     No Known Problems Daughter     Colon  "cancer Paternal Grandfather     Breast cancer Paternal Grandmother     No Known Problems Half-Sister     Anxiety disorder Half-Sister     Depression Half-Sister     ADD / ADHD Other     Bipolar disorder Other     Breast cancer Other     Cancer Other     Diabetes Other     Heart disease Other     Stroke Other     Stomach cancer Other     Other Other         respiratory disorder    Colon polyps Neg Hx       reports that she quit smoking about 9 years ago. Her smoking use included cigarettes. She has never used smokeless tobacco. She reports that she does not currently use alcohol after a past usage of about 1.0 standard drink of alcohol per week. She reports that she does not use drugs.    No current outpatient medications on file.    OBJECTIVE:  /67 (BP Location: Left arm, Patient Position: Sitting, Cuff Size: Adult)   Pulse 87   Temp 97.8 °F (36.6 °C) (Temporal)   Ht 154.9 cm (61\")   Wt 76.2 kg (168 lb)   SpO2 98%   Breastfeeding Yes   BMI 31.74 kg/m²    Estimated body mass index is 31.74 kg/m² as calculated from the following:    Height as of this encounter: 154.9 cm (61\").    Weight as of this encounter: 76.2 kg (168 lb).                Physical Exam  Vitals reviewed.   Constitutional:       General: She is not in acute distress.     Appearance: Normal appearance.   HENT:      Head: Normocephalic and atraumatic.   Cardiovascular:      Rate and Rhythm: Normal rate and regular rhythm.      Heart sounds: Normal heart sounds.   Pulmonary:      Breath sounds: Normal breath sounds. No wheezing.   Musculoskeletal:         General: Normal range of motion.   Skin:     General: Skin is warm and dry.   Neurological:      Mental Status: She is alert and oriented to person, place, and time.   Psychiatric:         Mood and Affect: Mood normal.         Behavior: Behavior normal.         Thought Content: Thought content normal.              Assessment/Plan    Diagnoses and all orders for this visit:    1. Low libido " (Primary)  -     Testosterone; Future    2. Low testosterone level in female  -     Testosterone; Future    Discussed healthier lifestyle and reducing stress. She is requesting recheck testosterone so will return in a few weeks for fasting recheck.             An After Visit Summary was printed and given to the patient at discharge.  Return if symptoms worsen or fail to improve.

## 2024-12-02 ENCOUNTER — OFFICE VISIT (OUTPATIENT)
Dept: FAMILY MEDICINE CLINIC | Facility: CLINIC | Age: 24
End: 2024-12-02
Payer: COMMERCIAL

## 2024-12-02 VITALS
WEIGHT: 163 LBS | HEART RATE: 84 BPM | TEMPERATURE: 97.9 F | HEIGHT: 61 IN | BODY MASS INDEX: 30.78 KG/M2 | OXYGEN SATURATION: 98 % | RESPIRATION RATE: 18 BRPM | DIASTOLIC BLOOD PRESSURE: 68 MMHG | SYSTOLIC BLOOD PRESSURE: 118 MMHG

## 2024-12-02 DIAGNOSIS — R05.1 ACUTE COUGH: ICD-10-CM

## 2024-12-02 DIAGNOSIS — J40 BRONCHITIS: Primary | ICD-10-CM

## 2024-12-02 LAB
EXPIRATION DATE: NORMAL
FLUAV AG UPPER RESP QL IA.RAPID: NOT DETECTED
FLUBV AG UPPER RESP QL IA.RAPID: NOT DETECTED
INTERNAL CONTROL: NORMAL
Lab: NORMAL
SARS-COV-2 AG UPPER RESP QL IA.RAPID: NOT DETECTED

## 2024-12-02 PROCEDURE — 99213 OFFICE O/P EST LOW 20 MIN: CPT | Performed by: NURSE PRACTITIONER

## 2024-12-02 PROCEDURE — 87428 SARSCOV & INF VIR A&B AG IA: CPT | Performed by: NURSE PRACTITIONER

## 2024-12-02 PROCEDURE — 1126F AMNT PAIN NOTED NONE PRSNT: CPT | Performed by: NURSE PRACTITIONER

## 2024-12-02 RX ORDER — AZITHROMYCIN 250 MG/1
TABLET, FILM COATED ORAL
Qty: 6 TABLET | Refills: 0 | Status: SHIPPED | OUTPATIENT
Start: 2024-12-02

## 2024-12-02 RX ORDER — GUAIFENESIN 200 MG/10ML
200 LIQUID ORAL 3 TIMES DAILY PRN
Qty: 180 ML | Refills: 1 | Status: SHIPPED | OUTPATIENT
Start: 2024-12-02

## 2024-12-02 RX ORDER — TRIAMCINOLONE ACETONIDE 40 MG/ML
40 INJECTION, SUSPENSION INTRA-ARTICULAR; INTRAMUSCULAR ONCE
Status: SHIPPED | OUTPATIENT
Start: 2024-12-02

## 2024-12-02 NOTE — PROGRESS NOTES
CC:   Chief Complaint   Patient presents with    Cough     Chest hurts to cough        History:  Marcie Roberts is a 24 y.o. female who presents today for evaluation of the above problems.      HPI  Patient presents for illness.  Reports symptoms started about 5 days ago and worsening with head congestion and chest congestion.  Reports symptoms are mainly in chest now.  Reports productive cough with chest tightness.  Denies any fever.  Has not been taking any over-the-counter medications for symptoms.  Reports children in home have had chest colds as well.    Allergies   Allergen Reactions    Cefoxitin Sodium-Dextrose Unknown - Low Severity     Past Medical History:   Diagnosis Date    ADHD     Anxiety     Depression     GERD (gastroesophageal reflux disease)     H. pylori infection     Migraine     Ovarian cyst      Past Surgical History:   Procedure Laterality Date    APPENDECTOMY      CHOLECYSTECTOMY      EAR TUBES      ENDOSCOPY  04/07/2017    Focal active chronic gastritis, Few Helicobacter organisms Dr. Moses    LAPAROSCOPIC CHOLECYSTECTOMY      MYRINGOTOMY W/ TUBES Bilateral 9/14/2022    Procedure: myringotomy tube insertion;  Surgeon: Idris Blancas MD;  Location: [facility];  Service: ENT;  Laterality: Bilateral;    TONSILLECTOMY AND ADENOIDECTOMY      WISDOM TOOTH EXTRACTION       Family History   Problem Relation Age of Onset    Diabetes Mother     Heart disease Mother     ADD / ADHD Brother     Seizures Sister     ADD / ADHD Sister     No Known Problems Sister     ADD / ADHD Daughter     No Known Problems Daughter     Colon cancer Paternal Grandfather     Breast cancer Paternal Grandmother     No Known Problems Half-Sister     Anxiety disorder Half-Sister     Depression Half-Sister     ADD / ADHD Other     Bipolar disorder Other     Breast cancer Other     Cancer Other     Diabetes Other     Heart disease Other     Stroke Other     Stomach cancer Other     Other Other         respiratory  "disorder    Colon polyps Neg Hx       reports that she quit smoking about 9 years ago. Her smoking use included cigarettes. She has never used smokeless tobacco. She reports that she does not currently use alcohol after a past usage of about 1.0 standard drink of alcohol per week. She reports that she does not use drugs.      Current Outpatient Medications:     azithromycin (Zithromax Z-Reinaldo) 250 MG tablet, Take 2 tablets by mouth on day 1, then 1 tablet daily on days 2-5, Disp: 6 tablet, Rfl: 0    guaifenesin (ROBITUSSIN) 100 MG/5ML liquid, Take 10 mL by mouth 3 (Three) Times a Day As Needed for Cough., Disp: 180 mL, Rfl: 1    Current Facility-Administered Medications:     triamcinolone acetonide (KENALOG-40) injection 40 mg, 40 mg, Intramuscular, Once, Lola Cassidy APRN    OBJECTIVE:  /68 (BP Location: Left arm, Patient Position: Sitting, Cuff Size: Adult)   Pulse 84   Temp 97.9 °F (36.6 °C) (Oral)   Resp 18   Ht 154.9 cm (61\")   Wt 73.9 kg (163 lb)   SpO2 98%   BMI 30.80 kg/m²    Estimated body mass index is 30.8 kg/m² as calculated from the following:    Height as of this encounter: 154.9 cm (61\").    Weight as of this encounter: 73.9 kg (163 lb).                Physical Exam  Vitals reviewed.   Constitutional:       General: She is not in acute distress.     Appearance: Normal appearance.   HENT:      Head: Normocephalic and atraumatic.      Right Ear: Tympanic membrane and external ear normal. A PE tube is present. Tympanic membrane is scarred.      Left Ear: External ear normal. Tympanic membrane is scarred. Tympanic membrane is not erythematous or bulging.      Mouth/Throat:      Pharynx: Posterior oropharyngeal erythema present.   Cardiovascular:      Rate and Rhythm: Normal rate and regular rhythm.      Heart sounds: Normal heart sounds.   Pulmonary:      Effort: No respiratory distress.      Breath sounds: Normal breath sounds. No wheezing.      Comments: Frequent congested " cough  Lymphadenopathy:      Cervical: No cervical adenopathy.   Skin:     General: Skin is warm and dry.   Neurological:      Mental Status: She is alert and oriented to person, place, and time.   Psychiatric:         Mood and Affect: Mood normal.         Behavior: Behavior normal.              Assessment/Plan    Diagnoses and all orders for this visit:    1. Bronchitis (Primary)  -     azithromycin (Zithromax Z-Reinaldo) 250 MG tablet; Take 2 tablets by mouth on day 1, then 1 tablet daily on days 2-5  Dispense: 6 tablet; Refill: 0  -     triamcinolone acetonide (KENALOG-40) injection 40 mg    2. Acute cough  -     POCT SARS-CoV-2 Antigen MARTÍN + Flu  -     triamcinolone acetonide (KENALOG-40) injection 40 mg  -     guaifenesin (ROBITUSSIN) 100 MG/5ML liquid; Take 10 mL by mouth 3 (Three) Times a Day As Needed for Cough.  Dispense: 180 mL; Refill: 1    Negative for flu and covid  Medication SE discussed.   If worse or no better in a few days- patient to let us know know.             An After Visit Summary was printed and given to the patient at discharge.  Return if symptoms worsen or fail to improve.

## 2025-05-30 ENCOUNTER — TELEPHONE (OUTPATIENT)
Dept: FAMILY MEDICINE CLINIC | Facility: CLINIC | Age: 25
End: 2025-05-30
Payer: COMMERCIAL

## 2025-05-30 NOTE — TELEPHONE ENCOUNTER
Caller: Marcie Roberts    Relationship: Self    Best call back number: [phone]     What is the best time to reach you: ANY    Who are you requesting to speak with (clinical staff, provider,  specific staff member): CLINICAL    Do you know the name of the person who called: SELF    What was the call regarding: PT WAS CALLING TO ASK IF DALE CAN CALL IN ANTIBIOTIC FOR INFECTED TATTOO. SHE SAYS THIS IS FOR HER  WHO HAS NOT BEEN SEEN IN THE OFFICE BEFORE. PT WAS ADVISED THAT HE WOULD NEED TO BE SEEN AS A NP IN THE OFFICE FIRST, BUT I TOLD PT I WOULD PUT THE MESSAGE BACK. PLEASE CALL TO ADVISE.     Is it okay if the provider responds through MyChart:  CALL BACK

## 2025-05-30 NOTE — TELEPHONE ENCOUNTER
Spoke with Marcie. Advised her  would need an appointment.  Appointment scheduled for 6/5/25.  Patient is unable to come in today.

## 2025-06-27 ENCOUNTER — OFFICE VISIT (OUTPATIENT)
Dept: FAMILY MEDICINE CLINIC | Facility: CLINIC | Age: 25
End: 2025-06-27
Payer: COMMERCIAL

## 2025-06-27 VITALS
TEMPERATURE: 98.2 F | BODY MASS INDEX: 30.78 KG/M2 | SYSTOLIC BLOOD PRESSURE: 112 MMHG | HEIGHT: 61 IN | WEIGHT: 163 LBS | DIASTOLIC BLOOD PRESSURE: 70 MMHG | OXYGEN SATURATION: 98 % | RESPIRATION RATE: 18 BRPM | HEART RATE: 96 BPM

## 2025-06-27 DIAGNOSIS — H93.8X3 EAR FULLNESS, BILATERAL: Primary | ICD-10-CM

## 2025-06-27 DIAGNOSIS — G44.209 ACUTE NON INTRACTABLE TENSION-TYPE HEADACHE: ICD-10-CM

## 2025-06-27 PROCEDURE — 1126F AMNT PAIN NOTED NONE PRSNT: CPT | Performed by: NURSE PRACTITIONER

## 2025-06-27 PROCEDURE — 1159F MED LIST DOCD IN RCRD: CPT | Performed by: NURSE PRACTITIONER

## 2025-06-27 PROCEDURE — 1160F RVW MEDS BY RX/DR IN RCRD: CPT | Performed by: NURSE PRACTITIONER

## 2025-06-27 PROCEDURE — 99213 OFFICE O/P EST LOW 20 MIN: CPT | Performed by: NURSE PRACTITIONER

## 2025-06-27 RX ORDER — DEXTROAMPHETAMINE SACCHARATE, AMPHETAMINE ASPARTATE, DEXTROAMPHETAMINE SULFATE AND AMPHETAMINE SULFATE 3.75; 3.75; 3.75; 3.75 MG/1; MG/1; MG/1; MG/1
15 TABLET ORAL 2 TIMES DAILY
COMMUNITY

## 2025-06-27 NOTE — PROGRESS NOTES
CC:   Chief Complaint   Patient presents with    Ear Fullness    Headache     Sharp pain in head that comes and goes        History:  Marcie Roberts is a 25 y.o. female who presents today for evaluation of the above problems.      HPI    Patient presents with complaint of bilateral ear fullness.  No fever or other symptoms.  Patient also reports yesterday she developed a sharp pain in posterior head that radiated up.  She also felt like right side of lower cheek was twitching off and on yesterday.  Those symptoms have resolved and no pain or muscle spasms today.  Reports she has had different episodes of head trauma in the past.  Some soreness to neck as well.    Patient reports depression and anxiety are well-controlled.  She has been taking ADHD medication prescribed per psych [facility]O- Pauline Kim-states doing well with medication.      Allergies   Allergen Reactions    Cefoxitin Sodium-Dextrose Unknown - Low Severity     Past Medical History:   Diagnosis Date    ADHD     Anxiety     Depression     GERD (gastroesophageal reflux disease)     H. pylori infection     Migraine     Ovarian cyst      Past Surgical History:   Procedure Laterality Date    APPENDECTOMY      CHOLECYSTECTOMY      EAR TUBES      ENDOSCOPY  04/07/2017    Focal active chronic gastritis, Few Helicobacter organisms Dr. Moses    LAPAROSCOPIC CHOLECYSTECTOMY      MYRINGOTOMY W/ TUBES Bilateral 9/14/2022    Procedure: myringotomy tube insertion;  Surgeon: Idris Blancas MD;  Location: [facility] OR;  Service: ENT;  Laterality: Bilateral;    TONSILLECTOMY AND ADENOIDECTOMY      WISDOM TOOTH EXTRACTION       Family History   Problem Relation Age of Onset    Diabetes Mother     Heart disease Mother     ADD / ADHD Brother     Seizures Sister     ADD / ADHD Sister     No Known Problems Sister     ADD / ADHD Daughter     No Known Problems Daughter     Colon cancer Paternal Grandfather     Breast cancer Paternal Grandmother     No Known  "Problems Half-Sister     Anxiety disorder Half-Sister     Depression Half-Sister     ADD / ADHD Other     Bipolar disorder Other     Breast cancer Other     Cancer Other     Diabetes Other     Heart disease Other     Stroke Other     Stomach cancer Other     Other Other         respiratory disorder    Colon polyps Neg Hx       reports that she quit smoking about 10 years ago. Her smoking use included cigarettes. She has never used smokeless tobacco. She reports that she does not currently use alcohol after a past usage of about 1.0 standard drink of alcohol per week. She reports that she does not use drugs.      Current Outpatient Medications:     amphetamine-dextroamphetamine (Adderall) 15 MG tablet, Take 1 tablet by mouth 2 (Two) Times a Day., Disp: , Rfl:   No current facility-administered medications for this visit.    OBJECTIVE:  /70 (BP Location: Left arm, Patient Position: Sitting, Cuff Size: Adult)   Pulse 96   Temp 98.2 °F (36.8 °C) (Temporal)   Resp 18   Ht 154.9 cm (61\")   Wt 73.9 kg (163 lb)   SpO2 98%   BMI 30.80 kg/m²    Estimated body mass index is 30.8 kg/m² as calculated from the following:    Height as of this encounter: 154.9 cm (61\").    Weight as of this encounter: 73.9 kg (163 lb).                Physical Exam  Vitals reviewed.   Constitutional:       General: She is not in acute distress.     Appearance: Normal appearance.   HENT:      Head: Normocephalic and atraumatic.      Right Ear: External ear normal. A middle ear effusion (mild amount of fluid- no erythema or bulging) is present. Tympanic membrane is scarred.      Left Ear: External ear normal. A middle ear effusion (mild amount of fluid, no erythema or bulging) is present. Tympanic membrane is scarred.   Cardiovascular:      Rate and Rhythm: Normal rate and regular rhythm.      Heart sounds: Normal heart sounds.   Pulmonary:      Effort: No respiratory distress.      Breath sounds: Normal breath sounds. No wheezing. "   Musculoskeletal:         General: Normal range of motion.   Skin:     General: Skin is warm and dry.   Neurological:      Mental Status: She is alert and oriented to person, place, and time.   Psychiatric:         Mood and Affect: Mood normal.         Behavior: Behavior normal.         Thought Content: Thought content normal.              Assessment/Plan    Diagnoses and all orders for this visit:    1. Ear fullness, bilateral (Primary)    2. Acute non intractable tension-type headache      Reassured patient that ears were not infected.  Can take over-the-counter allergy medicine for very mild fluid in ears.  Headache symptoms have resolved today.  If redevelops or any new symptoms-patient to let us know.  Discussed treating headaches.  Believe pain from yesterday was related to tension in neck.  Discussed neck stretching exercises, massage, heat application.  Patient voices understanding          An After Visit Summary was printed and given to the patient at discharge.  Return if symptoms worsen or fail to improve.

## (undated) DEVICE — TUBING, SUCTION, 1/4" X 12', STRAIGHT: Brand: MEDLINE

## (undated) DEVICE — SURGICAL SUCTION CONNECTING TUBE WITH MALE CONNECTOR AND SUCTION CLAMP, 2 FT. LONG (.6 M), 5 MM I.D.: Brand: CONMED

## (undated) DEVICE — GLV SURG BIOGEL M LTX PF 7 1/2

## (undated) DEVICE — TOWEL,OR,DSP,ST,BLUE,STD,4/PK,20PK/CS: Brand: MEDLINE

## (undated) DEVICE — BLD MYRNGTMY BEAVR LANCE/DWN/CUT NRW 45D